# Patient Record
Sex: FEMALE
[De-identification: names, ages, dates, MRNs, and addresses within clinical notes are randomized per-mention and may not be internally consistent; named-entity substitution may affect disease eponyms.]

---

## 2018-01-16 ENCOUNTER — HOSPITAL (OUTPATIENT)
Dept: OTHER | Age: 33
End: 2018-01-16
Attending: EMERGENCY MEDICINE

## 2018-01-16 LAB
AMORPH SED URNS QL MICRO: ABNORMAL
AMPHETAMINES UR QL SCN>500 NG/ML: POSITIVE
ANALYZER ANC (IANC): ABNORMAL
ANION GAP SERPL CALC-SCNC: 19 MMOL/L (ref 10–20)
APAP SERPL-MCNC: <2 MCG/ML (ref 10–30)
APPEARANCE UR: CLEAR
BACTERIA #/AREA URNS HPF: ABNORMAL /HPF
BARBITURATES UR QL SCN>200 NG/ML: NEGATIVE
BASOPHILS # BLD: 0.1 THOUSAND/MCL (ref 0–0.3)
BASOPHILS NFR BLD: 1 %
BENZODIAZ UR QL SCN>200 NG/ML: NEGATIVE
BILIRUB UR QL: NEGATIVE
BUN SERPL-MCNC: 7 MG/DL (ref 6–20)
BUN/CREAT SERPL: 9 (ref 7–25)
BZE UR QL SCN>150 NG/ML: NEGATIVE
CALCIUM SERPL-MCNC: 9.8 MG/DL (ref 8.4–10.2)
CANNABINOIDS UR QL SCN>50 NG/ML: NEGATIVE
CAOX CRY URNS QL MICRO: ABNORMAL
CHLORIDE: 101 MMOL/L (ref 98–107)
CO2 SERPL-SCNC: 25 MMOL/L (ref 21–32)
COLOR UR: YELLOW
CREAT SERPL-MCNC: 0.75 MG/DL (ref 0.51–0.95)
DIFFERENTIAL METHOD BLD: ABNORMAL
EOSINOPHIL # BLD: 0.1 THOUSAND/MCL (ref 0.1–0.5)
EOSINOPHIL NFR BLD: 2 %
EPITH CASTS #/AREA URNS LPF: ABNORMAL /[LPF]
ERYTHROCYTE [DISTWIDTH] IN BLOOD: 12.3 % (ref 11–15)
ETHANOL SERPL-MCNC: NORMAL MG/DL
FATTY CASTS #/AREA URNS LPF: ABNORMAL /[LPF]
GLUCOSE SERPL-MCNC: 115 MG/DL (ref 65–99)
GLUCOSE UR-MCNC: NEGATIVE MG/DL
GRAN CASTS #/AREA URNS LPF: ABNORMAL /[LPF]
HEMATOCRIT: 46 % (ref 36–46.5)
HGB BLD-MCNC: 15.9 GM/DL (ref 12–15.5)
HGB UR QL: NEGATIVE
HYALINE CASTS #/AREA URNS LPF: ABNORMAL /LPF (ref 0–5)
KETONES UR-MCNC: NEGATIVE MG/DL
LEUKOCYTE ESTERASE UR QL STRIP: ABNORMAL
LYMPHOCYTES # BLD: 2.4 THOUSAND/MCL (ref 1–4.8)
LYMPHOCYTES NFR BLD: 33 %
MCH RBC QN AUTO: 31.5 PG (ref 26–34)
MCHC RBC AUTO-ENTMCNC: 34.6 GM/DL (ref 32–36.5)
MCV RBC AUTO: 91.1 FL (ref 78–100)
MIXED CELL CASTS #/AREA URNS LPF: ABNORMAL /[LPF]
MONOCYTES # BLD: 1 THOUSAND/MCL (ref 0.3–0.9)
MONOCYTES NFR BLD: 14 %
MUCOUS THREADS URNS QL MICRO: PRESENT
NEUTROPHILS # BLD: 3.6 THOUSAND/MCL (ref 1.8–7.7)
NEUTROPHILS NFR BLD: 50 %
NEUTS SEG NFR BLD: ABNORMAL %
NITRITE UR QL: NEGATIVE
OPIATES UR QL SCN>300 NG/ML: NEGATIVE
PCP UR QL SCN>25 NG/ML: NEGATIVE
PERCENT NRBC: ABNORMAL
PH UR: 6 UNIT (ref 5–7)
PLATELET # BLD: 516 THOUSAND/MCL (ref 140–450)
POTASSIUM SERPL-SCNC: 5 MMOL/L (ref 3.4–5.1)
PROT UR QL: NEGATIVE MG/DL
RBC # BLD: 5.05 MILLION/MCL (ref 4–5.2)
RBC #/AREA URNS HPF: ABNORMAL /HPF (ref 0–3)
RBC CASTS #/AREA URNS LPF: ABNORMAL /[LPF]
RENAL EPI CELLS #/AREA URNS HPF: ABNORMAL /[HPF]
SALICYLATES SERPL-MCNC: <2.8 MG/DL
SODIUM SERPL-SCNC: 140 MMOL/L (ref 135–145)
SP GR UR: 1.01 (ref 1–1.03)
SPECIMEN SOURCE: ABNORMAL
SPERM URNS QL MICRO: ABNORMAL
SQUAMOUS #/AREA URNS HPF: ABNORMAL /HPF (ref 0–5)
T VAGINALIS URNS QL MICRO: ABNORMAL
TRI-PHOS CRY URNS QL MICRO: ABNORMAL
URATE CRY URNS QL MICRO: ABNORMAL
URINE REFLEX: ABNORMAL
URNS CMNT MICRO: ABNORMAL
UROBILINOGEN UR QL: 0.2 MG/DL (ref 0–1)
WAXY CASTS #/AREA URNS LPF: ABNORMAL /[LPF]
WBC # BLD: 7.2 THOUSAND/MCL (ref 4.2–11)
WBC #/AREA URNS HPF: ABNORMAL /HPF (ref 0–5)
WBC CASTS #/AREA URNS LPF: ABNORMAL /[LPF]
YEAST HYPHAE URNS QL MICRO: ABNORMAL
YEAST URNS QL MICRO: ABNORMAL

## 2018-01-17 ENCOUNTER — DIAGNOSTIC TRANS (OUTPATIENT)
Dept: OTHER | Age: 33
End: 2018-01-17

## 2018-01-17 LAB — HCG POINT OF CARE (5HGRST): NEGATIVE

## 2018-02-05 ENCOUNTER — IMAGING SERVICES (OUTPATIENT)
Dept: OTHER | Age: 33
End: 2018-02-05

## 2018-02-05 ENCOUNTER — HOSPITAL (OUTPATIENT)
Dept: OTHER | Age: 33
End: 2018-02-05

## 2018-02-05 LAB
ANALYZER ANC (IANC): NORMAL
ANION GAP SERPL CALC-SCNC: 17 MMOL/L (ref 10–20)
BASOPHILS # BLD: 0.1 THOUSAND/MCL (ref 0–0.3)
BASOPHILS NFR BLD: 1 %
BUN SERPL-MCNC: 18 MG/DL (ref 6–20)
BUN/CREAT SERPL: 22 (ref 7–25)
CALCIUM SERPL-MCNC: 9.5 MG/DL (ref 8.4–10.2)
CHLORIDE: 102 MMOL/L (ref 98–107)
CO2 SERPL-SCNC: 24 MMOL/L (ref 21–32)
CREAT SERPL-MCNC: 0.81 MG/DL (ref 0.51–0.95)
DIFFERENTIAL METHOD BLD: NORMAL
EOSINOPHIL # BLD: 0.2 THOUSAND/MCL (ref 0.1–0.5)
EOSINOPHIL NFR BLD: 2 %
ERYTHROCYTE [DISTWIDTH] IN BLOOD: 12.5 % (ref 11–15)
ETHANOL SERPL-MCNC: NORMAL MG/DL
GLUCOSE SERPL-MCNC: 121 MG/DL (ref 65–99)
HEMATOCRIT: 40.4 % (ref 36–46.5)
HGB BLD-MCNC: 13.6 GM/DL (ref 12–15.5)
LYMPHOCYTES # BLD: 2.2 THOUSAND/MCL (ref 1–4.8)
LYMPHOCYTES NFR BLD: 29 %
MCH RBC QN AUTO: 30.3 PG (ref 26–34)
MCHC RBC AUTO-ENTMCNC: 33.7 GM/DL (ref 32–36.5)
MCV RBC AUTO: 90 FL (ref 78–100)
MONOCYTES # BLD: 0.9 THOUSAND/MCL (ref 0.3–0.9)
MONOCYTES NFR BLD: 12 %
NEUTROPHILS # BLD: 4.2 THOUSAND/MCL (ref 1.8–7.7)
NEUTROPHILS NFR BLD: 56 %
NEUTS SEG NFR BLD: NORMAL %
PERCENT NRBC: NORMAL
PLATELET # BLD: 378 THOUSAND/MCL (ref 140–450)
POTASSIUM SERPL-SCNC: 4 MMOL/L (ref 3.4–5.1)
RBC # BLD: 4.49 MILLION/MCL (ref 4–5.2)
SODIUM SERPL-SCNC: 139 MMOL/L (ref 135–145)
WBC # BLD: 7.6 THOUSAND/MCL (ref 4.2–11)

## 2018-02-06 LAB
AMPHETAMINES UR QL SCN>500 NG/ML: POSITIVE
BARBITURATES UR QL SCN>200 NG/ML: NEGATIVE
BENZODIAZ UR QL SCN>200 NG/ML: POSITIVE
BZE UR QL SCN>150 NG/ML: NEGATIVE
CANNABINOIDS UR QL SCN>50 NG/ML: NEGATIVE
HCG POINT OF CARE (5HGRST): NEGATIVE
METHADONE UR QL SCN>300 NG/ML: NEGATIVE NG/ML
OPIATES UR QL SCN>300 NG/ML: NEGATIVE
PCP UR QL SCN>25 NG/ML: NEGATIVE

## 2020-07-22 ENCOUNTER — OFFICE VISIT (OUTPATIENT)
Dept: GASTROENTEROLOGY | Facility: CLINIC | Age: 35
End: 2020-07-22

## 2020-07-22 VITALS
SYSTOLIC BLOOD PRESSURE: 112 MMHG | HEART RATE: 86 BPM | TEMPERATURE: 98.6 F | HEIGHT: 63 IN | WEIGHT: 155 LBS | BODY MASS INDEX: 27.46 KG/M2 | OXYGEN SATURATION: 99 % | DIASTOLIC BLOOD PRESSURE: 64 MMHG

## 2020-07-22 DIAGNOSIS — R74.8 ELEVATED LIVER ENZYMES: ICD-10-CM

## 2020-07-22 DIAGNOSIS — B18.2 CHRONIC HEPATITIS C WITHOUT HEPATIC COMA (HCC): Primary | ICD-10-CM

## 2020-07-22 PROCEDURE — 99204 OFFICE O/P NEW MOD 45 MIN: CPT | Performed by: NURSE PRACTITIONER

## 2020-07-22 RX ORDER — TETRACYCLINE HCL 500 MG
1 CAPSULE ORAL DAILY
COMMUNITY
End: 2022-08-03 | Stop reason: HOSPADM

## 2020-07-22 RX ORDER — FOLIC ACID 1 MG/1
1 TABLET ORAL DAILY
COMMUNITY
End: 2022-08-03 | Stop reason: HOSPADM

## 2020-07-22 NOTE — PATIENT INSTRUCTIONS
Hepatitis C  Hepatitis C is a liver infection that is caused by a virus. Many people have no symptoms or only mild symptoms. Hepatitis C is contagious. This means that it can spread from person to person. You can get this disease through:  · Blood.  · Birth.  · Body fluids, like breast milk, tears, semen, vaginal fluids, and spit (saliva).  · Blood or organ donations done in the [place] before 1992.  Follow these instructions at home:  Medicines  · Take over-the-counter and prescription medicines only as told by your doctor.  · Take your antiviral medicine as told by your doctor. Do not stop taking the antiviral medicine even if you start to feel better.  · Do not take any new medicines unless your doctor says that this is okay. This includes over-the-counter medicines and birth control pills.  Activity  · Rest when you feel tired.  · Do not have sex until your doctor says this is okay.  · Ask your doctor when you may go back to school or work.  Eating and drinking    · Eat a balanced diet. Eat plenty of:  ? Fruits and vegetables.  ? Whole grains.  ? Low-fat (lean) meats or non-meat proteins, such as beans or tofu.  · Drink enough fluids to keep your pee (urine) clear or pale yellow.  · Do not drink alcohol.  How is this prevented?  · Wash your hands often with soap and water. If you do not have soap and water, use hand .  · Do not share needles or syringes.  · Practice safe sex and use condoms.  · Do not handle blood or body fluids without gloves or other protection.  · Avoid getting tattoos or piercings in places that are not clean.  General instructions  · Do not share toothbrushes, nail clippers, or razors.  · Wash your hands often with soap and water. If you do not have soap and water, use hand .  · Cover any cuts or open sores on your skin.  · Keep all follow-up visits as told by your doctor. This is important. You may need follow-up visits every 6-12 months.  Contact a doctor  if:  · You have a fever.  · You have belly (abdominal) pain.  · Your pee (urine) is dark.  · Your poop (bowel movement) is the color of hans.  · You have joint pain.  Get help right away if:  · You feel more and more tired (fatigued).  · You feel more and more weak.  · You do not feel like eating.  · You cannot eat or drink without throwing up (vomiting).  · Your skin or the whites of your eyes turn yellow (jaundice) or turn more yellow than they were before.  · You bruise or bleed easily.  Summary  · Hepatitis C is a liver infection that is caused by a virus.  · The virus can be spread through blood and body fluids.  · Do not take any new medicines unless your doctor says that this is okay.  · Wash your hands often with soap and water. This helps prevent infection.  This information is not intended to replace advice given to you by your health care provider. Make sure you discuss any questions you have with your health care provider.  Document Released: 11/30/2009 Document Revised: 11/30/2018 Document Reviewed: 01/23/2018  [employer] Patient Education © 2020 [employer]    Hepatitis C  Hepatitis C is a liver infection that is caused by a virus. Many people have no symptoms or only mild symptoms. Hepatitis C is contagious. This means that it can spread from person to person. You can get this disease through:  · Blood.  · Birth.  · Body fluids, like breast milk, tears, semen, vaginal fluids, and spit (saliva).  · Blood or organ donations done in the [place] before 1992.  Follow these instructions at home:  Medicines  · Take over-the-counter and prescription medicines only as told by your doctor.  · Take your antiviral medicine as told by your doctor. Do not stop taking the antiviral medicine even if you start to feel better.  · Do not take any new medicines unless your doctor says that this is okay. This includes over-the-counter medicines and birth control pills.  Activity  · Rest when you feel tired.  · Do  not have sex until your doctor says this is okay.  · Ask your doctor when you may go back to school or work.  Eating and drinking    · Eat a balanced diet. Eat plenty of:  ? Fruits and vegetables.  ? Whole grains.  ? Low-fat (lean) meats or non-meat proteins, such as beans or tofu.  · Drink enough fluids to keep your pee (urine) clear or pale yellow.  · Do not drink alcohol.  How is this prevented?  · Wash your hands often with soap and water. If you do not have soap and water, use hand .  · Do not share needles or syringes.  · Practice safe sex and use condoms.  · Do not handle blood or body fluids without gloves or other protection.  · Avoid getting tattoos or piercings in places that are not clean.  General instructions  · Do not share toothbrushes, nail clippers, or razors.  · Wash your hands often with soap and water. If you do not have soap and water, use hand .  · Cover any cuts or open sores on your skin.  · Keep all follow-up visits as told by your doctor. This is important. You may need follow-up visits every 6-12 months.  Contact a doctor if:  · You have a fever.  · You have belly (abdominal) pain.  · Your pee (urine) is dark.  · Your poop (bowel movement) is the color of hans.  · You have joint pain.  Get help right away if:  · You feel more and more tired (fatigued).  · You feel more and more weak.  · You do not feel like eating.  · You cannot eat or drink without throwing up (vomiting).  · Your skin or the whites of your eyes turn yellow (jaundice) or turn more yellow than they were before.  · You bruise or bleed easily.  Summary  · Hepatitis C is a liver infection that is caused by a virus.  · The virus can be spread through blood and body fluids.  · Do not take any new medicines unless your doctor says that this is okay.  · Wash your hands often with soap and water. This helps prevent infection.  This information is not intended to replace advice given to you by your health care  provider. Make sure you discuss any questions you have with your health care provider.  Document Released: 11/30/2009 Document Revised: 11/30/2018 Document Reviewed: 01/23/2018  Elsevier Patient Education © 2020 [employer].

## 2020-07-22 NOTE — PROGRESS NOTES
"Chief Complaint:   Chief Complaint   Patient presents with   • Hepatitis C     Pt recently tested positive for hep C-states at one point in the past she tested positive but after having futher labs they don't her it was negative; Pt presents today to discuss treatment options          Patient ID: Latisha Turner is a 35 y.o. female     History of Present Illness: This is a very pleasant 35-year-old female who is here today with diagnosis of hepatitis C.    Patient states that sometime in the past she had tested positive for hepatitis C about 10 years ago but she states after that \"no other labs were done and I thought it was negative because I was in long-term.\"  The patient states labs were repeated recently as noted below.  Patient is naïve to treatment. The patient has a history of tattoos, history of IV drug use, no blood transfusions.     6/16/2020 CMP unremarkable with the exception of ALT 37 AST within normal limits 27.  CBC unremarkable.  Platelet count 452.  Hepatitis B surface antibody reactive.  Hepatitis C antibody reactive HCVRNA quantitative 99518    The patient denies any nausea, vomiting, epigastric pain, dysphagia, pyrosis or hematemesis.  The patient denies any fever or chills.  Denies any melena or hematochezia.  Denies any unintentional weight loss or loss of appetite.  The patient denies any jaundice, icterus, edema or ascites.    Past Medical History:   Diagnosis Date   • Chronic hepatitis C (CMS/HCC)    • Lupus (CMS/HCC)        History reviewed. No pertinent surgical history.      Current Outpatient Medications:   •  Apple Cider Vinegar 500 MG tablet, Take 1 tablet by mouth Daily., Disp: , Rfl:   •  B Complex Vitamins (VITAMIN B COMPLEX PO), Take 1 capsule by mouth Daily., Disp: , Rfl:   •  folic acid (FOLVITE) 1 MG tablet, Take 1 mg by mouth Daily., Disp: , Rfl:   •  methotrexate 2.5 MG tablet, Take 10 mg by mouth 1 (One) Time Per Week., Disp: , Rfl:   •  Multiple Vitamins-Minerals (MULTIVITAMIN " "ADULT PO), Take 1 capsule by mouth Daily., Disp: , Rfl:     No Known Allergies    Social History     Socioeconomic History   • Marital status: Single     Spouse name: Not on file   • Number of children: Not on file   • Years of education: Not on file   • Highest education level: Not on file   Tobacco Use   • Smoking status: Current Every Day Smoker     Packs/day: 2.00     Types: Cigarettes   • Smokeless tobacco: Never Used   Substance and Sexual Activity   • Alcohol use: Not Currently   • Drug use: Not Currently       Family History   Problem Relation Age of Onset   • Colon cancer Neg Hx    • Colon polyps Neg Hx    • Esophageal cancer Neg Hx    • Liver cancer Neg Hx    • Liver disease Neg Hx    • Rectal cancer Neg Hx    • Stomach cancer Neg Hx        Vitals:    07/22/20 1336   BP: 112/64   BP Location: Left arm   Patient Position: Sitting   Cuff Size: Adult   Pulse: 86   Temp: 98.6 °F (37 °C)   TempSrc: Tympanic   SpO2: 99%   Weight: 70.3 kg (155 lb)   Height: 160 cm (63\")       Review of Systems:    General:    Present -feeling well   Skin:    Not Present-Rash   HEENT:     Not Present-Acute visual changes or Acute hearing changes   Neck :    Not Present- swollen glands   Genitourinary:      Not Present- burning, frequency, urgency hematuria, dysuria,   Cardiovascular:   Not Present-chest pain, palpitations, or pressure   Respiratory:   Not Present- shortness of breath or cough   Gastrointestinal:  Musculoskeletal:  Neurological:  Psychiatric:   Present as mentioned in the HP    Not Present. Recent gait disturbances.    Not Present-Seizures and weakness in extremities.    Not Present- Anxiety or Depression.       Physical Exam:    General Appearance:    Alert, cooperative, in no acute distress   Psych:    Mood appropriate    Eyes:          conjunctivae and sclerae normal, no   icterus, no pallor   ENMT:    Ears appear intact with no abnormalities noted oral mucosa moist   Neck:   No adenopathy, supple, trachea " midline, no thyromegaly, no   carotid bruit, no JVD    Cardiovascular:    Regular rhythm and normal rate, normal S1 and S2, no            murmur, no gallop, no rub, no click   Gastrointestinal:     Inspection normal.  Normal bowel sounds, no masses, no organomegaly, soft round non-tender, non-distended, no guarding, no rebound or tenderness. No hepatosplenomegaly.   Skin:   No bleeding, bruising or rash   Neurologic:   nonfocal         Body mass index is 27.46 kg/m². Patient's Body mass index is 27.46 kg/m². BMI is above normal parameters. Recommendations include: nutrition counseling.    Assessment and Plan:  Assessment/Plan   Latisha was seen today for hepatitis c.    Diagnoses and all orders for this visit:    Chronic hepatitis C without hepatic coma (CMS/HCC)  -     HCV RNA By PCR, Qn Rfx Chani; Future  -     HCV FibroSURE; Future  -     Hepatitis A Antibody, Total; Future  -     Hepatitis A Antibody, IgM; Future  -     Drug Screen (10), Serum; Future  -     Ethanol; Future  -     HIV-1 & HIV-2 Antibodies; Future  -     US Elastography Parenchyma; Future  -     US Liver; Future    Elevated liver enzymes  -     HCV RNA By PCR, Qn Rfx Chani; Future  -     HCV FibroSURE; Future  -     Hepatitis A Antibody, Total; Future  -     Hepatitis A Antibody, IgM; Future  -     Drug Screen (10), Serum; Future  -     Ethanol; Future  -     HIV-1 & HIV-2 Antibodies; Future  -     US Elastography Parenchyma; Future  -     US Liver; Future      Labs and imaging orders placed.  When all are resulted will contact patient and submit for prior authorization for treatment of hepatitis C.     Patient Instructions   Hepatitis C  Hepatitis C is a liver infection that is caused by a virus. Many people have no symptoms or only mild symptoms. Hepatitis C is contagious. This means that it can spread from person to person. You can get this disease through:  · Blood.  · Birth.  · Body fluids, like breast milk, tears, semen, vaginal fluids, and spit  (saliva).  · Blood or organ donations done in the [place] before 1992.  Follow these instructions at home:  Medicines  · Take over-the-counter and prescription medicines only as told by your doctor.  · Take your antiviral medicine as told by your doctor. Do not stop taking the antiviral medicine even if you start to feel better.  · Do not take any new medicines unless your doctor says that this is okay. This includes over-the-counter medicines and birth control pills.  Activity  · Rest when you feel tired.  · Do not have sex until your doctor says this is okay.  · Ask your doctor when you may go back to school or work.  Eating and drinking    · Eat a balanced diet. Eat plenty of:  ? Fruits and vegetables.  ? Whole grains.  ? Low-fat (lean) meats or non-meat proteins, such as beans or tofu.  · Drink enough fluids to keep your pee (urine) clear or pale yellow.  · Do not drink alcohol.  How is this prevented?  · Wash your hands often with soap and water. If you do not have soap and water, use hand .  · Do not share needles or syringes.  · Practice safe sex and use condoms.  · Do not handle blood or body fluids without gloves or other protection.  · Avoid getting tattoos or piercings in places that are not clean.  General instructions  · Do not share toothbrushes, nail clippers, or razors.  · Wash your hands often with soap and water. If you do not have soap and water, use hand .  · Cover any cuts or open sores on your skin.  · Keep all follow-up visits as told by your doctor. This is important. You may need follow-up visits every 6-12 months.  Contact a doctor if:  · You have a fever.  · You have belly (abdominal) pain.  · Your pee (urine) is dark.  · Your poop (bowel movement) is the color of hans.  · You have joint pain.  Get help right away if:  · You feel more and more tired (fatigued).  · You feel more and more weak.  · You do not feel like eating.  · You cannot eat or drink without  throwing up (vomiting).  · Your skin or the whites of your eyes turn yellow (jaundice) or turn more yellow than they were before.  · You bruise or bleed easily.  Summary  · Hepatitis C is a liver infection that is caused by a virus.  · The virus can be spread through blood and body fluids.  · Do not take any new medicines unless your doctor says that this is okay.  · Wash your hands often with soap and water. This helps prevent infection.  This information is not intended to replace advice given to you by your health care provider. Make sure you discuss any questions you have with your health care provider.  Document Released: 11/30/2009 Document Revised: 11/30/2018 Document Reviewed: 01/23/2018  [employer] Patient Education © 2020 [employer]    Hepatitis C  Hepatitis C is a liver infection that is caused by a virus. Many people have no symptoms or only mild symptoms. Hepatitis C is contagious. This means that it can spread from person to person. You can get this disease through:  · Blood.  · Birth.  · Body fluids, like breast milk, tears, semen, vaginal fluids, and spit (saliva).  · Blood or organ donations done in the [place] before 1992.  Follow these instructions at home:  Medicines  · Take over-the-counter and prescription medicines only as told by your doctor.  · Take your antiviral medicine as told by your doctor. Do not stop taking the antiviral medicine even if you start to feel better.  · Do not take any new medicines unless your doctor says that this is okay. This includes over-the-counter medicines and birth control pills.  Activity  · Rest when you feel tired.  · Do not have sex until your doctor says this is okay.  · Ask your doctor when you may go back to school or work.  Eating and drinking    · Eat a balanced diet. Eat plenty of:  ? Fruits and vegetables.  ? Whole grains.  ? Low-fat (lean) meats or non-meat proteins, such as beans or tofu.  · Drink enough fluids to keep your pee (urine) clear  or pale yellow.  · Do not drink alcohol.  How is this prevented?  · Wash your hands often with soap and water. If you do not have soap and water, use hand .  · Do not share needles or syringes.  · Practice safe sex and use condoms.  · Do not handle blood or body fluids without gloves or other protection.  · Avoid getting tattoos or piercings in places that are not clean.  General instructions  · Do not share toothbrushes, nail clippers, or razors.  · Wash your hands often with soap and water. If you do not have soap and water, use hand .  · Cover any cuts or open sores on your skin.  · Keep all follow-up visits as told by your doctor. This is important. You may need follow-up visits every 6-12 months.  Contact a doctor if:  · You have a fever.  · You have belly (abdominal) pain.  · Your pee (urine) is dark.  · Your poop (bowel movement) is the color of hans.  · You have joint pain.  Get help right away if:  · You feel more and more tired (fatigued).  · You feel more and more weak.  · You do not feel like eating.  · You cannot eat or drink without throwing up (vomiting).  · Your skin or the whites of your eyes turn yellow (jaundice) or turn more yellow than they were before.  · You bruise or bleed easily.  Summary  · Hepatitis C is a liver infection that is caused by a virus.  · The virus can be spread through blood and body fluids.  · Do not take any new medicines unless your doctor says that this is okay.  · Wash your hands often with soap and water. This helps prevent infection.  This information is not intended to replace advice given to you by your health care provider. Make sure you discuss any questions you have with your health care provider.  Document Released: 11/30/2009 Document Revised: 11/30/2018 Document Reviewed: 01/23/2018  [employer] Patient Education © 2020 [employer].        Next follow-up appointment        EMR Dragon/Transcription disclaimer:  Much of this encounter note is an  electronic transcription/translation of spoken language to printed text. The electronic translation of spoken language may permit erroneous, or at times, nonsensical words or phrases to be inadvertently transcribed; although I have reviewed the note for such errors, some may still exist.

## 2020-07-23 ENCOUNTER — TELEPHONE (OUTPATIENT)
Dept: GASTROENTEROLOGY | Facility: CLINIC | Age: 35
End: 2020-07-23

## 2020-07-23 DIAGNOSIS — R74.8 ELEVATED LIVER ENZYMES: ICD-10-CM

## 2020-07-23 DIAGNOSIS — B18.2 CHRONIC HEPATITIS C WITHOUT HEPATIC COMA (HCC): Primary | ICD-10-CM

## 2020-07-23 NOTE — TELEPHONE ENCOUNTER
Had to change one of the orders for the ultrasounds.     I have entered that and pended it to Alethea for review and signature.    I'll cx the incorrect order for her.

## 2020-07-30 ENCOUNTER — APPOINTMENT (OUTPATIENT)
Dept: ULTRASOUND IMAGING | Facility: HOSPITAL | Age: 35
End: 2020-07-30

## 2020-09-04 ENCOUNTER — HOSPITAL ENCOUNTER (OUTPATIENT)
Dept: ULTRASOUND IMAGING | Facility: HOSPITAL | Age: 35
Discharge: HOME | End: 2020-09-04
Admitting: NURSE PRACTITIONER

## 2020-09-04 ENCOUNTER — HOSPITAL ENCOUNTER (OUTPATIENT)
Dept: ULTRASOUND IMAGING | Facility: HOSPITAL | Age: 35
Discharge: HOME | End: 2020-09-04

## 2020-09-04 DIAGNOSIS — R74.8 ELEVATED LIVER ENZYMES: ICD-10-CM

## 2020-09-04 DIAGNOSIS — B18.2 CHRONIC HEPATITIS C WITHOUT HEPATIC COMA (HCC): ICD-10-CM

## 2020-09-04 PROCEDURE — 76981 USE PARENCHYMA: CPT

## 2020-09-04 PROCEDURE — 76705 ECHO EXAM OF ABDOMEN: CPT

## 2020-09-09 ENCOUNTER — TELEPHONE (OUTPATIENT)
Dept: GASTROENTEROLOGY | Facility: CLINIC | Age: 35
End: 2020-09-09

## 2020-09-10 ENCOUNTER — TELEPHONE (OUTPATIENT)
Dept: GASTROENTEROLOGY | Facility: CLINIC | Age: 35
End: 2020-09-10

## 2020-09-10 NOTE — TELEPHONE ENCOUNTER
Called and spoke to pt re: results-she VU. Pt tells me that she had a family emergency and that is why she hasn't had labs done. She was advised that she could do those at any point and when those were back we would be ready to submit paperwork for hep C treatment. Pt also advised to call me back with any further questions/problems.

## 2022-07-28 ENCOUNTER — TELEPHONE (OUTPATIENT)
Dept: OBSTETRICS AND GYNECOLOGY | Facility: CLINIC | Age: 37
End: 2022-07-28

## 2022-07-28 NOTE — TELEPHONE ENCOUNTER
Referral was placed in our office to schedule a new ob appointment, I tried to call patient to schedule that and the patient did not answer and I left a message.

## 2022-08-03 ENCOUNTER — LAB (OUTPATIENT)
Dept: LAB | Facility: HOSPITAL | Age: 37
End: 2022-08-03

## 2022-08-03 ENCOUNTER — INITIAL PRENATAL (OUTPATIENT)
Dept: OBSTETRICS AND GYNECOLOGY | Facility: CLINIC | Age: 37
End: 2022-08-03

## 2022-08-03 VITALS — SYSTOLIC BLOOD PRESSURE: 98 MMHG | BODY MASS INDEX: 27.81 KG/M2 | DIASTOLIC BLOOD PRESSURE: 62 MMHG | WEIGHT: 157 LBS

## 2022-08-03 DIAGNOSIS — O09.529 ANTEPARTUM MULTIGRAVIDA OF ADVANCED MATERNAL AGE: Primary | ICD-10-CM

## 2022-08-03 DIAGNOSIS — O36.80X0 ENCOUNTER TO DETERMINE FETAL VIABILITY OF PREGNANCY, SINGLE OR UNSPECIFIED FETUS: ICD-10-CM

## 2022-08-03 DIAGNOSIS — O98.419 CHRONIC HEPATITIS C COMPLICATING PREGNANCY, ANTEPARTUM: ICD-10-CM

## 2022-08-03 DIAGNOSIS — F17.210 CIGARETTE SMOKER: ICD-10-CM

## 2022-08-03 DIAGNOSIS — F19.91 HISTORY OF ILLICIT DRUG USE: ICD-10-CM

## 2022-08-03 DIAGNOSIS — Z32.01 POSITIVE PREGNANCY TEST: ICD-10-CM

## 2022-08-03 DIAGNOSIS — B18.2 CHRONIC HEPATITIS C COMPLICATING PREGNANCY, ANTEPARTUM: ICD-10-CM

## 2022-08-03 DIAGNOSIS — Z78.9 DATE OF LAST MENSTRUAL PERIOD (LMP) UNKNOWN: ICD-10-CM

## 2022-08-03 DIAGNOSIS — Z32.00 PREGNANCY EXAMINATION OR TEST, PREGNANCY UNCONFIRMED: ICD-10-CM

## 2022-08-03 LAB
ABO GROUP BLD: NORMAL
AMPHET+METHAMPHET UR QL: NEGATIVE
AMPHETAMINES UR QL: NEGATIVE
B-HCG UR QL: POSITIVE
BARBITURATES UR QL SCN: NEGATIVE
BASOPHILS # BLD AUTO: 0.06 10*3/MM3 (ref 0–0.2)
BASOPHILS NFR BLD AUTO: 0.7 % (ref 0–1.5)
BENZODIAZ UR QL SCN: NEGATIVE
BILIRUB UR QL STRIP: NEGATIVE
BLD GP AB SCN SERPL QL: NEGATIVE
BUPRENORPHINE SERPL-MCNC: NEGATIVE NG/ML
CANNABINOIDS SERPL QL: NEGATIVE
CLARITY UR: CLEAR
COCAINE UR QL: NEGATIVE
COLOR UR: YELLOW
DEPRECATED RDW RBC AUTO: 40.3 FL (ref 37–54)
EOSINOPHIL # BLD AUTO: 0.06 10*3/MM3 (ref 0–0.4)
EOSINOPHIL NFR BLD AUTO: 0.7 % (ref 0.3–6.2)
ERYTHROCYTE [DISTWIDTH] IN BLOOD BY AUTOMATED COUNT: 12.2 % (ref 12.3–15.4)
EXPIRATION DATE: ABNORMAL
GLUCOSE UR STRIP-MCNC: NEGATIVE MG/DL
HBV SURFACE AG SERPL QL IA: NORMAL
HCG INTACT+B SERPL-ACNC: NORMAL MIU/ML
HCT VFR BLD AUTO: 39 % (ref 34–46.6)
HCV AB SER DONR QL: REACTIVE
HGB BLD-MCNC: 13.3 G/DL (ref 12–15.9)
HGB UR QL STRIP.AUTO: NEGATIVE
HIV1+2 AB SER QL: NORMAL
IMM GRANULOCYTES # BLD AUTO: 0.05 10*3/MM3 (ref 0–0.05)
IMM GRANULOCYTES NFR BLD AUTO: 0.6 % (ref 0–0.5)
INTERNAL NEGATIVE CONTROL: NEGATIVE
INTERNAL POSITIVE CONTROL: POSITIVE
KETONES UR QL STRIP: NEGATIVE
LEUKOCYTE ESTERASE UR QL STRIP.AUTO: ABNORMAL
LYMPHOCYTES # BLD AUTO: 1.7 10*3/MM3 (ref 0.7–3.1)
LYMPHOCYTES NFR BLD AUTO: 18.7 % (ref 19.6–45.3)
Lab: ABNORMAL
Lab: NORMAL
MCH RBC QN AUTO: 31.2 PG (ref 26.6–33)
MCHC RBC AUTO-ENTMCNC: 34.1 G/DL (ref 31.5–35.7)
MCV RBC AUTO: 91.5 FL (ref 79–97)
METHADONE UR QL SCN: NEGATIVE
MONOCYTES # BLD AUTO: 0.79 10*3/MM3 (ref 0.1–0.9)
MONOCYTES NFR BLD AUTO: 8.7 % (ref 5–12)
NEUTROPHILS NFR BLD AUTO: 6.41 10*3/MM3 (ref 1.7–7)
NEUTROPHILS NFR BLD AUTO: 70.6 % (ref 42.7–76)
NITRITE UR QL STRIP: NEGATIVE
NRBC BLD AUTO-RTO: 0 /100 WBC (ref 0–0.2)
OPIATES UR QL: NEGATIVE
OXYCODONE UR QL SCN: NEGATIVE
PCP UR QL SCN: NEGATIVE
PH UR STRIP.AUTO: 8 [PH] (ref 5–8)
PLATELET # BLD AUTO: 396 10*3/MM3 (ref 140–450)
PMV BLD AUTO: 9.3 FL (ref 6–12)
PROPOXYPH UR QL: NEGATIVE
PROT UR QL STRIP: NEGATIVE
RBC # BLD AUTO: 4.26 10*6/MM3 (ref 3.77–5.28)
RH BLD: POSITIVE
SP GR UR STRIP: 1.01 (ref 1–1.03)
TRICYCLICS UR QL SCN: NEGATIVE
UROBILINOGEN UR QL STRIP: ABNORMAL
WBC NRBC COR # BLD: 9.07 10*3/MM3 (ref 3.4–10.8)

## 2022-08-03 PROCEDURE — 80081 OBSTETRIC PANEL INC HIV TSTG: CPT | Performed by: NURSE PRACTITIONER

## 2022-08-03 PROCEDURE — 87522 HEPATITIS C REVRS TRNSCRPJ: CPT | Performed by: NURSE PRACTITIONER

## 2022-08-03 PROCEDURE — 80306 DRUG TEST PRSMV INSTRMNT: CPT | Performed by: NURSE PRACTITIONER

## 2022-08-03 PROCEDURE — 86803 HEPATITIS C AB TEST: CPT | Performed by: NURSE PRACTITIONER

## 2022-08-03 PROCEDURE — 84702 CHORIONIC GONADOTROPIN TEST: CPT | Performed by: NURSE PRACTITIONER

## 2022-08-03 PROCEDURE — 36415 COLL VENOUS BLD VENIPUNCTURE: CPT

## 2022-08-03 PROCEDURE — 81025 URINE PREGNANCY TEST: CPT | Performed by: NURSE PRACTITIONER

## 2022-08-03 PROCEDURE — 87591 N.GONORRHOEAE DNA AMP PROB: CPT | Performed by: NURSE PRACTITIONER

## 2022-08-03 PROCEDURE — 81003 URINALYSIS AUTO W/O SCOPE: CPT | Performed by: NURSE PRACTITIONER

## 2022-08-03 PROCEDURE — 87491 CHLMYD TRACH DNA AMP PROBE: CPT | Performed by: NURSE PRACTITIONER

## 2022-08-03 PROCEDURE — 87661 TRICHOMONAS VAGINALIS AMPLIF: CPT | Performed by: NURSE PRACTITIONER

## 2022-08-03 PROCEDURE — 87086 URINE CULTURE/COLONY COUNT: CPT | Performed by: NURSE PRACTITIONER

## 2022-08-03 RX ORDER — PRENATAL VIT NO.126/IRON/FOLIC 28MG-0.8MG
TABLET ORAL DAILY
COMMUNITY

## 2022-08-03 NOTE — PROGRESS NOTES
I spent approximately 60 minutes with the patient acquiring the health and history intake, reviewing prior records, discussing topics related to healthy pregnancy, entering orders, and documentation. Her LMP is unknown. She thinks it may have been May 5th or May 15th.  This was not a planned pregnancy. This is her 3rd pregnancy. She had a vaginal delivery with her son in August 2009. She says she delivered at 38 weeks gestation. She tells me that she had no complications but that it was a fast delivery. She had a vaginal delivery with her daughter in August 2010. Again, she says she delivered at 38 weeks gestation with no complications. She had both vaginal deliveries in [place], [place]. Neither of her children live with her.  She signed releases for us to get her delivery notes.   The patient tells me that she had labs done at [facility] in 2020 and was told that she had Hepatitis C. She was supposed to see a Gastroenterologist at [facility]. She has not kept her appointments with them. She also tells me that she has Lupus and has history of chicken pox. The patient says she does not have a history of anxiety or depression. She filled out the depression screening questionnaire and scored 3.   A newob bag is given. The 1st trimester teaching was done with the patient. We discussed a healthy diet and exercise and what is recommended. I also discussed Listeriosis and Toxoplasmosis and what fish to avoid due to high mercury levels. I informed patient not to be in hot tubs, saunas, or tanning beds. We discussed that spotting may occur after intercourse which is common, but if heavy bleeding like a period occurs to [facility] or [facility] if clinic is closed.  I encouraged her to make an appointment with the dentist if she has not had a dental exam and cleaning in the last 6 months.  I instructed the patient that alcohol, illicit drug use, and tobacco smoking are not recommended in  pregnancy. She says she is trying to quit smoking. She now smokes 1/2 pack cigarettes daily. She says she has a history of drug use. She tells me that she last used Meth about 2 weeks ago. She says she is doing good and not having any problems.  She is unsure if she wants to breastfeed.  I encouraged the patient to get the TDAP vaccine in the 3rd trimester.  I discussed with the patient that a pediatrician needs to be chosen prior to delivery for the infant to have an appointment scheduled before leaving the hospital.  I discussed lab tests will be done today. She says she had a pap smear at [facility] in 2020. She signed a release for us to get the records. All questions were answered at this time. She is scheduled for an ultrasound and to see Lay JETER on 8/24/22.

## 2022-08-04 PROBLEM — O98.419 CHRONIC HEPATITIS C COMPLICATING PREGNANCY, ANTEPARTUM (HCC): Status: ACTIVE | Noted: 2022-08-04

## 2022-08-04 PROBLEM — B18.2 CHRONIC HEPATITIS C COMPLICATING PREGNANCY, ANTEPARTUM: Status: ACTIVE | Noted: 2022-08-04

## 2022-08-04 LAB
BACTERIA SPEC AEROBE CULT: NORMAL
C TRACH RRNA CVX QL NAA+PROBE: NEGATIVE
N GONORRHOEA RRNA SPEC QL NAA+PROBE: NEGATIVE
RPR SER QL: NORMAL
RUBV IGG SERPL IA-ACNC: 5.02 INDEX
TRICHOMONAS VAGINALIS PCR: NEGATIVE

## 2022-08-05 LAB
HCV RNA SERPL NAA+PROBE-ACNC: NORMAL IU/ML
HCV RNA SERPL NAA+PROBE-LOG IU: 5.11 LOG10 IU/ML
TEST INFORMATION: NORMAL

## 2022-08-11 ENCOUNTER — REFERRAL TRIAGE (OUTPATIENT)
Dept: LABOR AND DELIVERY | Facility: HOSPITAL | Age: 37
End: 2022-08-11

## 2022-08-12 ENCOUNTER — TELEPHONE (OUTPATIENT)
Dept: OBSTETRICS AND GYNECOLOGY | Facility: CLINIC | Age: 37
End: 2022-08-12

## 2022-08-12 NOTE — TELEPHONE ENCOUNTER
Tried to call patient about a referral that was placed in our office, patient did not answer and I couldn't leave a voicemail. Patient is scheduled for a new ob appointment with [facility] for 08/24.

## 2022-08-24 ENCOUNTER — LAB (OUTPATIENT)
Dept: LAB | Facility: HOSPITAL | Age: 37
End: 2022-08-24

## 2022-08-24 ENCOUNTER — INITIAL PRENATAL (OUTPATIENT)
Dept: OBSTETRICS AND GYNECOLOGY | Facility: CLINIC | Age: 37
End: 2022-08-24

## 2022-08-24 VITALS — BODY MASS INDEX: 28.87 KG/M2 | WEIGHT: 163 LBS | SYSTOLIC BLOOD PRESSURE: 108 MMHG | DIASTOLIC BLOOD PRESSURE: 60 MMHG

## 2022-08-24 DIAGNOSIS — O98.419 CHRONIC HEPATITIS C COMPLICATING PREGNANCY, ANTEPARTUM: ICD-10-CM

## 2022-08-24 DIAGNOSIS — M32.9 SYSTEMIC LUPUS ERYTHEMATOSUS COMPLICATING PREGNANCY IN FIRST TRIMESTER: ICD-10-CM

## 2022-08-24 DIAGNOSIS — O09.521 MULTIGRAVIDA OF ADVANCED MATERNAL AGE IN FIRST TRIMESTER: ICD-10-CM

## 2022-08-24 DIAGNOSIS — Z3A.13 13 WEEKS GESTATION OF PREGNANCY: Primary | ICD-10-CM

## 2022-08-24 DIAGNOSIS — O09.91 HIGH-RISK PREGNANCY IN FIRST TRIMESTER: ICD-10-CM

## 2022-08-24 DIAGNOSIS — Z13.79 ENCOUNTER FOR GENETIC SCREENING FOR DOWN SYNDROME: ICD-10-CM

## 2022-08-24 DIAGNOSIS — O99.331 MATERNAL TOBACCO USE IN FIRST TRIMESTER: ICD-10-CM

## 2022-08-24 DIAGNOSIS — O99.891 SYSTEMIC LUPUS ERYTHEMATOSUS COMPLICATING PREGNANCY IN FIRST TRIMESTER: ICD-10-CM

## 2022-08-24 DIAGNOSIS — O99.321 DRUG USE COMPLICATING PREGNANCY IN FIRST TRIMESTER: ICD-10-CM

## 2022-08-24 DIAGNOSIS — B18.2 CHRONIC HEPATITIS C COMPLICATING PREGNANCY, ANTEPARTUM: ICD-10-CM

## 2022-08-24 PROCEDURE — 36415 COLL VENOUS BLD VENIPUNCTURE: CPT | Performed by: NURSE PRACTITIONER

## 2022-08-24 PROCEDURE — 80053 COMPREHEN METABOLIC PANEL: CPT | Performed by: NURSE PRACTITIONER

## 2022-08-24 PROCEDURE — 99214 OFFICE O/P EST MOD 30 MIN: CPT | Performed by: NURSE PRACTITIONER

## 2022-08-24 RX ORDER — ASPIRIN 81 MG/1
81 TABLET ORAL DAILY
Qty: 90 TABLET | Refills: 1 | Status: SHIPPED | OUTPATIENT
Start: 2022-08-24 | End: 2023-03-08 | Stop reason: HOSPADM

## 2022-08-24 NOTE — PROGRESS NOTES
[facility]  Obstetrics  Date of Service: 2022    CHIEF COMPLAINT:  New prenatal visit    HISTORY OF PRESENT ILLNESS:  Latisha Turner is a 37 y.o. y/o  at 13w2d by LMP (Patient's last menstrual period was 05/15/2022.).  This was not a planned pregnancy and the patient is supported by significant other, but he stepped outside.  She denies any vaginal bleeding.  She has started taking a prenatal vitamin.    REVIEW OF SYSTEMS  Review of Systems   Constitutional: Negative for activity change, appetite change, chills, diaphoresis, fatigue, fever, unexpected weight gain and unexpected weight loss.   Respiratory: Negative for apnea, chest tightness and shortness of breath.    Cardiovascular: Negative for chest pain and palpitations.   Gastrointestinal: Negative for abdominal distention, abdominal pain, constipation and diarrhea.   Genitourinary: Negative for amenorrhea, breast discharge, breast lump, breast pain, decreased libido, decreased urine volume, difficulty urinating, dyspareunia, dysuria, flank pain, frequency, genital sores, hematuria, pelvic pain, pelvic pressure, urgency, urinary incontinence, vaginal bleeding, vaginal discharge and vaginal pain.   Musculoskeletal: Negative for myalgias.   Skin: Negative for color change, dry skin and skin lesions.   Neurological: Negative for light-headedness and headache.   Psychiatric/Behavioral: Negative for agitation, dysphoric mood, sleep disturbance, suicidal ideas, depressed mood and stress. The patient is not nervous/anxious.        PRENATAL RISK FACTORS   Problems (from 22 to present)     Problem Noted Resolved    High-risk pregnancy in first trimester 2022 by Lay Hunt, CORONA No    Multigravida of advanced maternal age in first trimester 2022 by Lay Hunt, CORONA No    Systemic lupus erythematosus complicating pregnancy in first trimester (HCC) 2022 by Lay Hunt  CORONA Daniel No    Drug use complicating pregnancy in first trimester 2022 by Lay Hunt APRN No    Maternal tobacco use in first trimester 2022 by Lay Hunt APRN No    Chronic hepatitis C complicating pregnancy, antepartum (HCC) 2022 by Ernestine Dyson APRN No    Overview Signed 2022  8:13 AM by Ernestine Dyson APRN     Has not been treated; needs referral to GI for tx after delivery         Chronic hepatitis C without hepatic coma (HCC) 2020 by Alethea Ocampo APRN No          DATING CRITERIA:  LMP (05/15/2022) -- PURVI 2023  1TUS (2022 at 13w2d) -- PURVI 2023    OBSTETRIC HISTORY:  OB History    Para Term  AB Living   3 2 2     2   SAB IAB Ectopic Molar Multiple Live Births             2      # Outcome Date GA Lbr Juan/2nd Weight Sex Delivery Anes PTL Lv   3 Current            2 Term 08/06/10 38w4d  3459 g (7 lb 10 oz) F Vag-Spont EPI  LESLEY      Complications: Smoker   1 Term 09 39w1d  3289 g (7 lb 4 oz) M Vag-Spont EPI  LESLEY      Birth Comments: nuchal cord x 1.  unknown GBS      Complications: Meconium stained amniotic fluid, PROM (premature rupture of membranes)     GYN HISTORY:    Denies h/o abnormal pap smears  Last pap smear:   Last Completed Pap Smear          PAP SMEAR (Every 3 Years) Next due on 6/10/2023    06/10/2020  SCANNED - PAP SMEAR              Denies h/o gynecologic surgeries, including biopsies of the cervix    PAST MEDICAL HISTORY:  Past Medical History:   Diagnosis Date   • Abnormal Pap smear of cervix    • Chronic hepatitis C (HCC)    • Lupus (HCC)    • Substance abuse (HCC)    • Trauma    • Varicella      PAST SURGICAL HISTORY:  Past Surgical History:   Procedure Laterality Date   • WISDOM TOOTH EXTRACTION     • WRIST FRACTURE SURGERY Left      FAMILY HISTORY:  Family History   Problem Relation Age of Onset   • Diabetes Mother    • Thyroid disease Mother    • No Known Problems Daughter    • No Known  Problems Son    • Diabetes Maternal Grandmother    • Heart disease Maternal Grandmother    • Kidney failure Maternal Grandmother    • Cancer Maternal Grandfather    • Colon cancer Neg Hx    • Colon polyps Neg Hx    • Esophageal cancer Neg Hx    • Liver cancer Neg Hx    • Liver disease Neg Hx    • Rectal cancer Neg Hx    • Stomach cancer Neg Hx      SOCIAL HISTORY:  Social History     Socioeconomic History   • Marital status: Significant Other   Tobacco Use   • Smoking status: Current Every Day Smoker     Packs/day: 0.50     Types: Cigarettes   • Smokeless tobacco: Never Used   Vaping Use   • Vaping Use: Former   Substance and Sexual Activity   • Alcohol use: Not Currently   • Drug use: Not Currently     Types: Methamphetamines   • Sexual activity: Yes     Partners: Male     Comment: last pap smear negative 6/10/20 at [facility]     GENETIC SCREENING:  Age >36 yo as of PURVI: yes  Thalassemia: no  NTD: no  CHD: no  Down Syndrome/MR/Fragile X/Autism: no  Ashkenazi Buddhism with Junior-Sachs, [place], familial dysautonomia: no  Sickle cell disease or trait: no  Hemophilia: no  Muscular dystrophy: no  Cystic fibrosis: no  Ravenel's chorea: no  Birth defects: no  Genetic/chromosomal disorders: yes, SLE    INFECTION HISTORY:  TB exposure: no  HSV: no  Illness since LMP: no  Prior GBS infected child: no  STIs: no    ALLERGIES:  No Known Allergies    MEDICATIONS:  Prior to Admission medications    Medication Sig Start Date End Date Taking? Authorizing Provider   prenatal vitamin (prenatal, CLASSIC, vitamin) tablet Take  by mouth Daily.   Yes Provider, MD Saeid   aspirin (aspirin) 81 MG EC tablet Take 1 tablet by mouth Daily. 8/24/22   Lay Hunt APRN       PHYSICAL EXAM:   /60   Wt 73.9 kg (163 lb)   LMP 05/15/2022   BMI 28.87 kg/m²   General: Alert, healthy, no distress, well nourished and well developed.  Neurologic: Alert, oriented to person, place, and time.  Gait normal.   Cranial nerves II-XII grossly intact.  HEENT: Normocephalic, atraumatic.  Extraocular muscles intact, pupils equal and reactive x2.    Teeth: Normal hygiene.  Neck: Supple, no adenopathy, thyroid normal size, non-tender, without nodularity, trachea midline.  Lungs: Normal respiratory effort.  Clear to auscultation bilaterally.  No wheezes, rhonci, or rales.  Heart: Regular rate and rhythm.  No murmer, rub or gallop.  Abdomen: Soft, non-tender, non-distended,no masses, no hepatosplenomegaly, no hernia.  Skin: No rash, no lesions.  Extremities: No cyanosis, clubbing or edema.      Preliminary TVUS- single IUP measuring 13w2d,  bpm, gestational sac seen, bilateral ovaries wnl    IMPRESSION:  Latisha Turner is a 37 y.o.  at 13w2d for a new prenatal visit.    PLAN:  1.  IUP at 13w2d  - Prenatal labs reviewed  - Genetic testing, including cystic fibrosis, was discussed and patient desires NIPS with gender  - Continue prenatal vitamins  - Weight gain counseling performed.   - Pregravid BMI 18.5-24.9: Recommend 25-35 lb  - Return to clinic in 4 weeks for return prenatal visit  - Need MFM consultation with fetal anatomy scan due to SLE, AMA: will arrange and call pt with information   - Reviewed COVID-19 visitation policy  - Reviewed COVID-19 precautions     Diagnosis Plan   1. 13 weeks gestation of pregnancy     2. High-risk pregnancy in first trimester     3. Multigravida of advanced maternal age in first trimester     4. Systemic lupus erythematosus complicating pregnancy in first trimester (HCC)  Comprehensive Metabolic Panel    Protein, Urine, 24 Hour - Urine, Clean Catch    aspirin (aspirin) 81 MG EC tablet   5. Drug use complicating pregnancy in first trimester  Pt used meth last middle of July, but has not since then   6. Chronic hepatitis C complicating pregnancy, antepartum (HCC)  Will need to see GI pp    7. Encounter for genetic screening for Down Syndrome  Panorama Prenatal Screen - Blood,   8.  Maternal tobacco use in first trimester  Encouraged complete smoking cessation       CORONA Rainey  8/25/2022  12:55 CDT

## 2022-08-25 PROBLEM — O09.521 MULTIGRAVIDA OF ADVANCED MATERNAL AGE IN FIRST TRIMESTER: Status: ACTIVE | Noted: 2022-08-25

## 2022-08-25 PROBLEM — M32.9: Status: ACTIVE | Noted: 2022-08-25

## 2022-08-25 PROBLEM — O99.891: Status: ACTIVE | Noted: 2022-08-25

## 2022-08-25 PROBLEM — O09.91 HIGH-RISK PREGNANCY IN FIRST TRIMESTER: Status: ACTIVE | Noted: 2022-08-25

## 2022-08-25 PROBLEM — O99.321 DRUG USE COMPLICATING PREGNANCY IN FIRST TRIMESTER: Status: ACTIVE | Noted: 2022-08-25

## 2022-08-25 PROBLEM — O99.331 MATERNAL TOBACCO USE IN FIRST TRIMESTER: Status: ACTIVE | Noted: 2022-08-25

## 2022-08-25 LAB
ALBUMIN SERPL-MCNC: 3.7 G/DL (ref 3.5–5.2)
ALBUMIN/GLOB SERPL: 1.4 G/DL
ALP SERPL-CCNC: 52 U/L (ref 39–117)
ALT SERPL W P-5'-P-CCNC: 43 U/L (ref 1–33)
ANION GAP SERPL CALCULATED.3IONS-SCNC: 9.3 MMOL/L (ref 5–15)
AST SERPL-CCNC: 24 U/L (ref 1–32)
BILIRUB SERPL-MCNC: <0.2 MG/DL (ref 0–1.2)
BUN SERPL-MCNC: 9 MG/DL (ref 6–20)
BUN/CREAT SERPL: 11.1 (ref 7–25)
CALCIUM SPEC-SCNC: 8.9 MG/DL (ref 8.6–10.5)
CHLORIDE SERPL-SCNC: 103 MMOL/L (ref 98–107)
CO2 SERPL-SCNC: 24.7 MMOL/L (ref 22–29)
CREAT SERPL-MCNC: 0.81 MG/DL (ref 0.57–1)
EGFRCR SERPLBLD CKD-EPI 2021: 96.6 ML/MIN/1.73
GLOBULIN UR ELPH-MCNC: 2.7 GM/DL
GLUCOSE SERPL-MCNC: 89 MG/DL (ref 65–99)
POTASSIUM SERPL-SCNC: 4 MMOL/L (ref 3.5–5.2)
PROT SERPL-MCNC: 6.4 G/DL (ref 6–8.5)
SODIUM SERPL-SCNC: 137 MMOL/L (ref 136–145)

## 2022-08-31 ENCOUNTER — TELEPHONE (OUTPATIENT)
Dept: OBSTETRICS AND GYNECOLOGY | Facility: CLINIC | Age: 37
End: 2022-08-31

## 2022-08-31 NOTE — TELEPHONE ENCOUNTER
Tried calling pt with no response, left VM for her to return our call.  [facility] pt picked up 24 hour urine at 12 pm yesterday then dropped it back off today at 10:45am.  We will need full 24 hour urine specimen for accurate results.

## 2022-09-23 ENCOUNTER — ROUTINE PRENATAL (OUTPATIENT)
Dept: OBSTETRICS AND GYNECOLOGY | Facility: CLINIC | Age: 37
End: 2022-09-23

## 2022-09-23 VITALS — DIASTOLIC BLOOD PRESSURE: 62 MMHG | WEIGHT: 167 LBS | SYSTOLIC BLOOD PRESSURE: 118 MMHG | BODY MASS INDEX: 29.58 KG/M2

## 2022-09-23 DIAGNOSIS — O98.419 CHRONIC HEPATITIS C COMPLICATING PREGNANCY, ANTEPARTUM: ICD-10-CM

## 2022-09-23 DIAGNOSIS — Z36.89 ENCOUNTER FOR FETAL ANATOMIC SURVEY: ICD-10-CM

## 2022-09-23 DIAGNOSIS — O09.521 MULTIGRAVIDA OF ADVANCED MATERNAL AGE IN FIRST TRIMESTER: ICD-10-CM

## 2022-09-23 DIAGNOSIS — B18.2 CHRONIC HEPATITIS C COMPLICATING PREGNANCY, ANTEPARTUM: ICD-10-CM

## 2022-09-23 DIAGNOSIS — O99.332 MATERNAL TOBACCO USE IN SECOND TRIMESTER: ICD-10-CM

## 2022-09-23 DIAGNOSIS — O99.891 SYSTEMIC LUPUS ERYTHEMATOSUS AFFECTING PREGNANCY IN SECOND TRIMESTER: ICD-10-CM

## 2022-09-23 DIAGNOSIS — O09.92 HIGH-RISK PREGNANCY IN SECOND TRIMESTER: Primary | ICD-10-CM

## 2022-09-23 DIAGNOSIS — B18.2 CHRONIC HEPATITIS C WITHOUT HEPATIC COMA: ICD-10-CM

## 2022-09-23 DIAGNOSIS — O09.522 MULTIGRAVIDA OF ADVANCED MATERNAL AGE IN SECOND TRIMESTER: ICD-10-CM

## 2022-09-23 DIAGNOSIS — M32.9 SYSTEMIC LUPUS ERYTHEMATOSUS AFFECTING PREGNANCY IN SECOND TRIMESTER: ICD-10-CM

## 2022-09-23 DIAGNOSIS — O99.322 DRUG USE COMPLICATING PREGNANCY IN SECOND TRIMESTER: ICD-10-CM

## 2022-09-23 PROCEDURE — 99213 OFFICE O/P EST LOW 20 MIN: CPT | Performed by: NURSE PRACTITIONER

## 2022-09-23 NOTE — PROGRESS NOTES
CC: Prenatal visit    Laitsha Turner is a 37 y.o.  at 17w4d.  Doing well.  Denies contractions, LOF, or VB.  Was previously recommend to have her anatomy U/S and MFM referral with [facility] or [facility]. Pt voices that she does not feel it is necessary for her to see a high risk doctor this pregnancy as she feels her lupus has not been an issue. Pt also voices that she does feel she needs to take ASA as her blood pressure is fine. Counseled patient our reasoning for her MFM referral and baby ASA is for prevention of Pre-E because having lupus increases her risk for developing Pre-E. Of course, discussed that she has the right to decline care that she does want to do. Pt declines MFM referral at this time.     /62   Wt 75.8 kg (167 lb)   LMP 05/15/2022   BMI 29.58 kg/m²   SVE: Deferred     Fetal Heart Rate: +Vscan     Problems (from 22 to present)     Problem Noted Resolved    High-risk pregnancy in second trimester 2022 by Lay Hunt APRN No    Multigravida of advanced maternal age in second trimester 2022 by Lay Hunt APRN No    Overview Signed 2022 12:05 PM by Mary Naranjo APRN     NIPS: low risk male fetus         Systemic lupus erythematosus complicating pregnancy in first trimester (HCC) 2022 by Lay Hunt APRN No    Overview Signed 2022 11:57 AM by Mary Naranjo APRN     Pt states Lupus has not been a problem for her in 2 years. Declines MFM referral.   Does not want feel she needs ASA for prevention of Pre-E.          Drug use complicating pregnancy in first trimester 2022 by Lay Hunt APRN No    Maternal tobacco use in second trimester 2022 by Lay Hunt APRN No    Chronic hepatitis C complicating pregnancy, antepartum (HCC) 2022 by Ernestine Dyson APRN No    Overview Signed 2022  8:13 AM by Ernestine Dyson APRN     Has not been treated; needs  referral to GI for tx after delivery         Chronic hepatitis C without hepatic coma (HCC) 2020 by Alethea Ocampo, APRN No          A/P: Latisha Turner is a 37 y.o.  at 17w4d.  - Anatomy US in 3 weeks in MAD; pt declines MFM referral  - Cramping precautions  - RTC in 3 weeks     Diagnosis Plan   1. High-risk pregnancy in second trimester  US Ob 14 + Weeks Single or First Gestation   2. Multigravida of advanced maternal age in first trimester  US Ob 14 + Weeks Single or First Gestation   3. Systemic lupus erythematosus affecting pregnancy in second trimester (HCC)  US Ob 14 + Weeks Single or First Gestation   4. Drug use complicating pregnancy in second trimester  US Ob 14 + Weeks Single or First Gestation   5. Chronic hepatitis C complicating pregnancy, antepartum (HCC)  US Ob 14 + Weeks Single or First Gestation   6. Chronic hepatitis C without hepatic coma (HCC)     7. Maternal tobacco use in second trimester  US Ob 14 + Weeks Single or First Gestation   8. Encounter for fetal anatomic survey  US Ob 14 + Weeks Single or First Gestation   9. Multigravida of advanced maternal age in second trimester       Mary Callahan, APRN  2022  12:06 CDT

## 2022-10-12 ENCOUNTER — ROUTINE PRENATAL (OUTPATIENT)
Dept: OBSTETRICS AND GYNECOLOGY | Facility: CLINIC | Age: 37
End: 2022-10-12

## 2022-10-12 VITALS — BODY MASS INDEX: 30.11 KG/M2 | SYSTOLIC BLOOD PRESSURE: 108 MMHG | DIASTOLIC BLOOD PRESSURE: 54 MMHG | WEIGHT: 170 LBS

## 2022-10-12 DIAGNOSIS — O98.419 CHRONIC HEPATITIS C COMPLICATING PREGNANCY, ANTEPARTUM: ICD-10-CM

## 2022-10-12 DIAGNOSIS — B18.2 CHRONIC HEPATITIS C WITHOUT HEPATIC COMA: ICD-10-CM

## 2022-10-12 DIAGNOSIS — M32.9 SYSTEMIC LUPUS ERYTHEMATOSUS COMPLICATING PREGNANCY IN FIRST TRIMESTER: ICD-10-CM

## 2022-10-12 DIAGNOSIS — O99.332 MATERNAL TOBACCO USE IN SECOND TRIMESTER: ICD-10-CM

## 2022-10-12 DIAGNOSIS — O09.92 HIGH-RISK PREGNANCY IN SECOND TRIMESTER: Primary | ICD-10-CM

## 2022-10-12 DIAGNOSIS — B18.2 CHRONIC HEPATITIS C COMPLICATING PREGNANCY, ANTEPARTUM: ICD-10-CM

## 2022-10-12 DIAGNOSIS — O99.321 DRUG USE COMPLICATING PREGNANCY IN FIRST TRIMESTER: ICD-10-CM

## 2022-10-12 DIAGNOSIS — O99.891 SYSTEMIC LUPUS ERYTHEMATOSUS COMPLICATING PREGNANCY IN FIRST TRIMESTER: ICD-10-CM

## 2022-10-12 DIAGNOSIS — O09.522 MULTIGRAVIDA OF ADVANCED MATERNAL AGE IN SECOND TRIMESTER: ICD-10-CM

## 2022-10-12 PROCEDURE — 99214 OFFICE O/P EST MOD 30 MIN: CPT | Performed by: STUDENT IN AN ORGANIZED HEALTH CARE EDUCATION/TRAINING PROGRAM

## 2022-10-12 NOTE — PROGRESS NOTES
CC: Prenatal visit    Latisha Turner is a 37 y.o.  at 20w2d.  Doing well.  Denies contractions, LOF, or VB.  Reports good FM.    Again discussed Lupus-declines referral to MFM or plaquenil, stating did not do well on plaquenil previously, no recent flare    /54   Wt 77.1 kg (170 lb)   LMP 05/15/2022   BMI 30.11 kg/m²   SVE: deferred     Fetal Heart Rate: 147 us     Prelim US: breech, posterior placenta, HOPE 13.7 cm,  bpm, BPD 38.5%, HC 17.9%, AC 32.6%, FL 14.9%, CL 4.43 cm, all anatomy seen and normal, boy    A/P: Latisha Turner is a 37 y.o.  at 20w2d.  - RTC in 4  weeks  - Reviewed COVID-19 visitation policy  - Reviewed COVID-19 precautions    Problem List Items Addressed This Visit        Gastrointestinal Abdominal     Chronic hepatitis C without hepatic coma (HCC)       Gravid and     Chronic hepatitis C complicating pregnancy, antepartum (HCC)    Overview     Has not been treated; needs referral to GI for tx after delivery         High-risk pregnancy in second trimester - Primary    Multigravida of advanced maternal age in second trimester    Overview     NIPS: low risk male fetus         Systemic lupus erythematosus complicating pregnancy in first trimester (HCC)    Overview     Pt states Lupus has not been a problem for her in 2 years. Declines MFM referral.   Declines ASA for prevention of Pre-E.  Declines Plaquenil.         Drug use complicating pregnancy in first trimester    Overview     Reports meth use early pregnancy  Neg UDS at IOB            Tobacco    Maternal tobacco use in second trimester          Diagnosis Plan   1. High-risk pregnancy in second trimester        2. Multigravida of advanced maternal age in second trimester        3. Systemic lupus erythematosus complicating pregnancy in first trimester (HCC)        4. Drug use complicating pregnancy in first trimester        5. Chronic hepatitis C complicating pregnancy, antepartum (HCC)        6. Chronic  hepatitis C without hepatic coma (HCC)        7. Maternal tobacco use in second trimester          Shameka Thomas DO  10/30/2022  20:32 CDT

## 2022-11-08 ENCOUNTER — ROUTINE PRENATAL (OUTPATIENT)
Dept: OBSTETRICS AND GYNECOLOGY | Facility: CLINIC | Age: 37
End: 2022-11-08

## 2022-11-08 VITALS — DIASTOLIC BLOOD PRESSURE: 58 MMHG | WEIGHT: 177 LBS | SYSTOLIC BLOOD PRESSURE: 106 MMHG | BODY MASS INDEX: 31.35 KG/M2

## 2022-11-08 DIAGNOSIS — O99.891 SYSTEMIC LUPUS ERYTHEMATOSUS COMPLICATING PREGNANCY IN FIRST TRIMESTER: ICD-10-CM

## 2022-11-08 DIAGNOSIS — O99.332 MATERNAL TOBACCO USE IN SECOND TRIMESTER: ICD-10-CM

## 2022-11-08 DIAGNOSIS — Z3A.24 24 WEEKS GESTATION OF PREGNANCY: Primary | ICD-10-CM

## 2022-11-08 DIAGNOSIS — O99.321 DRUG USE COMPLICATING PREGNANCY IN FIRST TRIMESTER: ICD-10-CM

## 2022-11-08 DIAGNOSIS — M32.9 SYSTEMIC LUPUS ERYTHEMATOSUS COMPLICATING PREGNANCY IN FIRST TRIMESTER: ICD-10-CM

## 2022-11-08 DIAGNOSIS — O09.522 MULTIGRAVIDA OF ADVANCED MATERNAL AGE IN SECOND TRIMESTER: ICD-10-CM

## 2022-11-08 DIAGNOSIS — O98.419 CHRONIC HEPATITIS C COMPLICATING PREGNANCY, ANTEPARTUM: ICD-10-CM

## 2022-11-08 DIAGNOSIS — B18.2 CHRONIC HEPATITIS C WITHOUT HEPATIC COMA: ICD-10-CM

## 2022-11-08 DIAGNOSIS — O09.92 HIGH-RISK PREGNANCY IN SECOND TRIMESTER: ICD-10-CM

## 2022-11-08 DIAGNOSIS — B18.2 CHRONIC HEPATITIS C COMPLICATING PREGNANCY, ANTEPARTUM: ICD-10-CM

## 2022-11-08 PROCEDURE — 99213 OFFICE O/P EST LOW 20 MIN: CPT | Performed by: NURSE PRACTITIONER

## 2022-11-08 NOTE — PROGRESS NOTES
CC: Prenatal visit    Latisha Turner is a 37 y.o.  at 24w1d.  Doing well.  No complaints.  Denies contractions, LOF, or VB.  Reports good FM.    /58   Wt 80.3 kg (177 lb)   LMP 05/15/2022   BMI 31.35 kg/m²              Problems (from 22 to present)     Problem Noted Resolved    High-risk pregnancy in second trimester 2022 by Lay Hunt APRN No    Multigravida of advanced maternal age in second trimester 2022 by Lay Hunt APRN No    Overview Signed 2022 12:05 PM by Mary Naranjo APRN     NIPS: low risk male fetus         Systemic lupus erythematosus complicating pregnancy in first trimester (HCC) 2022 by Lay Hunt APRN No    Overview Addendum 10/30/2022  8:30 PM by Shameka Thomas DO     Pt states Lupus has not been a problem for her in 2 years. Declines MFM referral.   Declines ASA for prevention of Pre-E.  Declines Plaquenil.         Drug use complicating pregnancy in first trimester 2022 by Lay Hunt APRN No    Overview Signed 10/30/2022  8:32 PM by Shameka Thomas DO     Reports meth use early pregnancy  Neg UDS at IOB         Maternal tobacco use in second trimester 2022 by Lay Hunt APRN No    Chronic hepatitis C complicating pregnancy, antepartum (HCC) 2022 by Ernestine Dyson APRN No    Overview Signed 2022  8:13 AM by Ernestine Dyson APRN     Has not been treated; needs referral to GI for tx after delivery         Chronic hepatitis C without hepatic coma (HCC) 2020 by Alethea Ocampo APRN No          A/P: Latisha Turner is a 37 y.o.  at 24w1d.  Discussed Tdap, pt had [facility] along with flu shot  3T labs to be done at next appointment  - RTC in 4 weeks     Diagnosis Plan   1. 24 weeks gestation of pregnancy        2. High-risk pregnancy in second trimester        3. Multigravida of advanced maternal age in second trimester         4. Systemic lupus erythematosus complicating pregnancy in first trimester (HCC)        5. Drug use complicating pregnancy in first trimester        6. Chronic hepatitis C complicating pregnancy, antepartum (HCC)        7. Chronic hepatitis C without hepatic coma (HCC)        8. Maternal tobacco use in second trimester            Lay Hunt, CORONA  11/8/2022  14:35 CST

## 2022-12-05 ENCOUNTER — LAB (OUTPATIENT)
Dept: LAB | Facility: HOSPITAL | Age: 37
End: 2022-12-05

## 2022-12-05 ENCOUNTER — ROUTINE PRENATAL (OUTPATIENT)
Dept: OBSTETRICS AND GYNECOLOGY | Facility: CLINIC | Age: 37
End: 2022-12-05

## 2022-12-05 VITALS — DIASTOLIC BLOOD PRESSURE: 64 MMHG | BODY MASS INDEX: 32.42 KG/M2 | SYSTOLIC BLOOD PRESSURE: 106 MMHG | WEIGHT: 183 LBS

## 2022-12-05 DIAGNOSIS — O09.92 HIGH-RISK PREGNANCY IN SECOND TRIMESTER: ICD-10-CM

## 2022-12-05 DIAGNOSIS — M32.9 SYSTEMIC LUPUS ERYTHEMATOSUS COMPLICATING PREGNANCY IN FIRST TRIMESTER: ICD-10-CM

## 2022-12-05 DIAGNOSIS — O09.522 MULTIGRAVIDA OF ADVANCED MATERNAL AGE IN SECOND TRIMESTER: ICD-10-CM

## 2022-12-05 DIAGNOSIS — B18.2 CHRONIC HEPATITIS C COMPLICATING PREGNANCY, ANTEPARTUM: ICD-10-CM

## 2022-12-05 DIAGNOSIS — Z13.1 SCREENING FOR DIABETES MELLITUS: ICD-10-CM

## 2022-12-05 DIAGNOSIS — Z36.9 ANTENATAL SCREENING ENCOUNTER: ICD-10-CM

## 2022-12-05 DIAGNOSIS — B18.2 CHRONIC HEPATITIS C WITHOUT HEPATIC COMA: ICD-10-CM

## 2022-12-05 DIAGNOSIS — O99.332 MATERNAL TOBACCO USE IN SECOND TRIMESTER: ICD-10-CM

## 2022-12-05 DIAGNOSIS — Z3A.28 28 WEEKS GESTATION OF PREGNANCY: Primary | ICD-10-CM

## 2022-12-05 DIAGNOSIS — O99.321 DRUG USE COMPLICATING PREGNANCY IN FIRST TRIMESTER: ICD-10-CM

## 2022-12-05 DIAGNOSIS — O98.419 CHRONIC HEPATITIS C COMPLICATING PREGNANCY, ANTEPARTUM: ICD-10-CM

## 2022-12-05 DIAGNOSIS — O99.891 SYSTEMIC LUPUS ERYTHEMATOSUS COMPLICATING PREGNANCY IN FIRST TRIMESTER: ICD-10-CM

## 2022-12-05 DIAGNOSIS — Z13.1 SCREENING FOR DIABETES MELLITUS: Primary | ICD-10-CM

## 2022-12-05 LAB
DEPRECATED RDW RBC AUTO: 38.7 FL (ref 37–54)
ERYTHROCYTE [DISTWIDTH] IN BLOOD BY AUTOMATED COUNT: 12.3 % (ref 12.3–15.4)
HCT VFR BLD AUTO: 33.7 % (ref 34–46.6)
HGB BLD-MCNC: 11.9 G/DL (ref 12–15.9)
MCH RBC QN AUTO: 30.4 PG (ref 26.6–33)
MCHC RBC AUTO-ENTMCNC: 35.3 G/DL (ref 31.5–35.7)
MCV RBC AUTO: 86.2 FL (ref 79–97)
PLATELET # BLD AUTO: 391 10*3/MM3 (ref 140–450)
PMV BLD AUTO: 8.9 FL (ref 6–12)
RBC # BLD AUTO: 3.91 10*6/MM3 (ref 3.77–5.28)
WBC NRBC COR # BLD: 9.12 10*3/MM3 (ref 3.4–10.8)

## 2022-12-05 PROCEDURE — 99213 OFFICE O/P EST LOW 20 MIN: CPT

## 2022-12-05 PROCEDURE — 82950 GLUCOSE TEST: CPT

## 2022-12-05 PROCEDURE — 85027 COMPLETE CBC AUTOMATED: CPT

## 2022-12-05 PROCEDURE — 36415 COLL VENOUS BLD VENIPUNCTURE: CPT

## 2022-12-05 NOTE — PROGRESS NOTES
CC: Prenatal visit    Latisha Turner is a 37 y.o.  at 28w0d.  Doing well.  Denies contractions, LOF, or VB.  Reports good FM.    /64   Wt 83 kg (183 lb)   LMP 05/15/2022   BMI 32.42 kg/m²   SVE: NA  Fundal Height (cm): 28 cm  Fetal Heart Rate: 145     Problems (from 22 to present)     Problem Noted Resolved    High-risk pregnancy in second trimester 2022 by Lay Hunt APRN No    Multigravida of advanced maternal age in second trimester 2022 by Lay Hunt APRN No    Overview Signed 2022 12:05 PM by Mary Naranjo APRN     NIPS: low risk male fetus         Systemic lupus erythematosus complicating pregnancy in first trimester (HCC) 2022 by Lay Hunt APRN No    Overview Addendum 10/30/2022  8:30 PM by Shameka Thomas DO     Pt states Lupus has not been a problem for her in 2 years. Declines MFM referral.   Declines ASA for prevention of Pre-E.  Declines Plaquenil.         Drug use complicating pregnancy in first trimester 2022 by Lay Hunt APRN No    Overview Signed 10/30/2022  8:32 PM by Shameka Thomas DO     Reports meth use early pregnancy  Neg UDS at IOB         Maternal tobacco use in second trimester 2022 by Lay Hunt APRN No    Chronic hepatitis C complicating pregnancy, antepartum (HCC) 2022 by Ernestine Dyson APRN No    Overview Signed 2022  8:13 AM by Ernestine Dyson APRN     Has not been treated; needs referral to GI for tx after delivery         Chronic hepatitis C without hepatic coma (HCC) 2020 by Alethea Ocampo APRN No          A/P: Latisha Turner is a 37 y.o.  at 28w0d.  - RTC in 2 weeks  - 3T labs today and UDS  - Had Tdap at [facility]  - Reviewed COVID-19 visitation policy  - Reviewed COVID-19 precautions     Diagnosis Plan   1. 28 weeks gestation of pregnancy        2. High-risk pregnancy in second trimester        3. Multigravida of  advanced maternal age in second trimester        4. Systemic lupus erythematosus complicating pregnancy in first trimester (HCC)        5. Drug use complicating pregnancy in first trimester        6. Chronic hepatitis C complicating pregnancy, antepartum (HCC)        7. Chronic hepatitis C without hepatic coma (HCC)        8. Maternal tobacco use in second trimester        9.  screening encounter  CBC (No Diff)    Urine Drug Screen - Urine, Clean Catch        CORONA Munson  2022  09:27 CST

## 2022-12-06 LAB — GLUCOSE 1H P 100 G GLC PO SERPL-MCNC: 114 MG/DL (ref 65–139)

## 2022-12-08 ENCOUNTER — PATIENT OUTREACH (OUTPATIENT)
Dept: OBSTETRICS AND GYNECOLOGY | Facility: HOSPITAL | Age: 37
End: 2022-12-08

## 2022-12-08 NOTE — OUTREACH NOTE
[facility]  Unable to Reach       Questions/Answers    Flowsheet Row Responses   Pending Outreach Confirm Patient Interest   Call Attempt First   Outcome Left message, MyChart message sent to patient   Next Call Attempt Date 12/15/22              Amelie Vogt RN  Maternity Nurse Navigator    12/8/2022, 14:23 CST

## 2022-12-15 ENCOUNTER — PATIENT OUTREACH (OUTPATIENT)
Dept: OBSTETRICS AND GYNECOLOGY | Facility: HOSPITAL | Age: 37
End: 2022-12-15

## 2022-12-15 NOTE — OUTREACH NOTE
[facility]  Unable to Reach       Questions/Answers    Flowsheet Row Responses   Pending Outreach Confirm Patient Interest   Call Attempt Second   Outcome Left message   Unable to reach comments: will close next week if no return interest              Amelie Vogt RN  Maternity Nurse Navigator    12/15/2022, 10:20 CST

## 2023-02-02 ENCOUNTER — HOSPITAL ENCOUNTER (EMERGENCY)
Facility: HOSPITAL | Age: 38
Discharge: HOME | End: 2023-02-02
Payer: MEDICAID

## 2023-02-02 PROCEDURE — 99211 OFF/OP EST MAY X REQ PHY/QHP: CPT

## 2023-02-06 ENCOUNTER — ROUTINE PRENATAL (OUTPATIENT)
Dept: OBSTETRICS AND GYNECOLOGY | Facility: CLINIC | Age: 38
End: 2023-02-06
Payer: MEDICAID

## 2023-02-06 VITALS — DIASTOLIC BLOOD PRESSURE: 74 MMHG | WEIGHT: 203 LBS | SYSTOLIC BLOOD PRESSURE: 108 MMHG | BODY MASS INDEX: 35.96 KG/M2

## 2023-02-06 DIAGNOSIS — O99.321 DRUG USE COMPLICATING PREGNANCY IN FIRST TRIMESTER: ICD-10-CM

## 2023-02-06 DIAGNOSIS — O99.891 SYSTEMIC LUPUS ERYTHEMATOSUS COMPLICATING PREGNANCY IN FIRST TRIMESTER: ICD-10-CM

## 2023-02-06 DIAGNOSIS — O99.332 MATERNAL TOBACCO USE IN SECOND TRIMESTER: ICD-10-CM

## 2023-02-06 DIAGNOSIS — O09.92 HIGH-RISK PREGNANCY IN SECOND TRIMESTER: ICD-10-CM

## 2023-02-06 DIAGNOSIS — Z36.9 ANTENATAL SCREENING ENCOUNTER: ICD-10-CM

## 2023-02-06 DIAGNOSIS — M32.9 SYSTEMIC LUPUS ERYTHEMATOSUS COMPLICATING PREGNANCY IN FIRST TRIMESTER: ICD-10-CM

## 2023-02-06 DIAGNOSIS — O09.522 MULTIGRAVIDA OF ADVANCED MATERNAL AGE IN SECOND TRIMESTER: ICD-10-CM

## 2023-02-06 DIAGNOSIS — O98.419 CHRONIC HEPATITIS C COMPLICATING PREGNANCY, ANTEPARTUM: ICD-10-CM

## 2023-02-06 DIAGNOSIS — Z3A.37 37 WEEKS GESTATION OF PREGNANCY: Primary | ICD-10-CM

## 2023-02-06 DIAGNOSIS — B18.2 CHRONIC HEPATITIS C COMPLICATING PREGNANCY, ANTEPARTUM: ICD-10-CM

## 2023-02-06 DIAGNOSIS — B18.2 CHRONIC HEPATITIS C WITHOUT HEPATIC COMA: ICD-10-CM

## 2023-02-06 DIAGNOSIS — O09.92 HIGH-RISK PREGNANCY IN SECOND TRIMESTER: Primary | ICD-10-CM

## 2023-02-06 PROCEDURE — 99213 OFFICE O/P EST LOW 20 MIN: CPT

## 2023-02-06 RX ORDER — ALBUTEROL SULFATE 90 UG/1
2 AEROSOL, METERED RESPIRATORY (INHALATION) EVERY 6 HOURS PRN
COMMUNITY
Start: 2023-02-02 | End: 2023-02-21

## 2023-02-06 NOTE — PROGRESS NOTES
CC: Prenatal visit    Latisha Turner is a 37 y.o.  at 37w0d.  Doing well.  Denies contractions, LOF, or VB.  Reports good FM.  Reports traveling to [place] via the bus, now reports a 10 day history of cough and congestion. Reports ribs are sore and hurting from coughing. Did a [facility] visit with  and received an inhaler, it has helped some. She has also taken benadryl that has helped with a runny nose. Denies any fever.   Offered flu/covid swab, patient declined.   Discussed EIOL or waiting for natural labor. Patient would like to wait and see if natural labor happens. May discuss EIOL at next visit.   Reports she has missed several appointments due to her truck being down.   Bedside US: Cephalic.     /74   Wt 92.1 kg (203 lb)   LMP 05/15/2022   BMI 35.96 kg/m²   SVE: NA  Fundal Height (cm): 37 cm  Fetal Heart Rate: 148     Problems (from 22 to present)     Problem Noted Resolved    High-risk pregnancy in second trimester 2022 by Lay Hunt APRN No    Multigravida of advanced maternal age in second trimester 2022 by Lay Hunt APRN No    Overview Signed 2022 12:05 PM by Mary Naranjo APRN     NIPS: low risk male fetus         Systemic lupus erythematosus complicating pregnancy in first trimester (HCC) 2022 by Lay Hunt APRN No    Overview Addendum 10/30/2022  8:30 PM by Shameka Thomas DO     Pt states Lupus has not been a problem for her in 2 years. Declines MFM referral.   Declines ASA for prevention of Pre-E.  Declines Plaquenil.         Drug use complicating pregnancy in first trimester 2022 by Lay Hunt APRN No    Overview Signed 10/30/2022  8:32 PM by Shameka Thomas DO     Reports meth use early pregnancy  Neg UDS at IOB         Maternal tobacco use in second trimester 2022 by Gilbert, Lay María, APRN No    Chronic hepatitis C complicating pregnancy, antepartum (HCC)  2022 by Ernestine Dyson APRN No    Overview Signed 2022  8:13 AM by Ernestine Dyson APRN     Has not been treated; needs referral to GI for tx after delivery         Chronic hepatitis C without hepatic coma (HCC) 2020 by Alethea Ocampo APRN No          A/P: Latisha Turner is a 37 y.o.  at 37w0d.  - RTC in 1 weeks with BPP  - Reviewed COVID-19 visitation policy  - Reviewed COVID-19 precautions     Diagnosis Plan   1. 37 weeks gestation of pregnancy        2. High-risk pregnancy in second trimester        3. Multigravida of advanced maternal age in second trimester  D/t transportation issues patient unable to come to the clinic bi-weekly for  testing.       4. Systemic lupus erythematosus complicating pregnancy in first trimester (HCC)        5. Drug use complicating pregnancy in first trimester        6. Chronic hepatitis C complicating pregnancy, antepartum (HCC)        7. Chronic hepatitis C without hepatic coma (HCC)        8. Maternal tobacco use in second trimester          CORONA Munson  2023  15:45 CST

## 2023-02-13 ENCOUNTER — ROUTINE PRENATAL (OUTPATIENT)
Dept: OBSTETRICS AND GYNECOLOGY | Facility: CLINIC | Age: 38
End: 2023-02-13
Payer: MEDICAID

## 2023-02-13 VITALS — BODY MASS INDEX: 37.38 KG/M2 | WEIGHT: 211 LBS | DIASTOLIC BLOOD PRESSURE: 72 MMHG | SYSTOLIC BLOOD PRESSURE: 110 MMHG

## 2023-02-13 DIAGNOSIS — B18.2 CHRONIC HEPATITIS C WITHOUT HEPATIC COMA: ICD-10-CM

## 2023-02-13 DIAGNOSIS — B18.2 CHRONIC HEPATITIS C COMPLICATING PREGNANCY, ANTEPARTUM: ICD-10-CM

## 2023-02-13 DIAGNOSIS — O09.92 HIGH-RISK PREGNANCY IN SECOND TRIMESTER: ICD-10-CM

## 2023-02-13 DIAGNOSIS — O99.321 DRUG USE COMPLICATING PREGNANCY IN FIRST TRIMESTER: ICD-10-CM

## 2023-02-13 DIAGNOSIS — O99.332 MATERNAL TOBACCO USE IN SECOND TRIMESTER: ICD-10-CM

## 2023-02-13 DIAGNOSIS — M32.9 SYSTEMIC LUPUS ERYTHEMATOSUS COMPLICATING PREGNANCY IN FIRST TRIMESTER: ICD-10-CM

## 2023-02-13 DIAGNOSIS — O98.419 CHRONIC HEPATITIS C COMPLICATING PREGNANCY, ANTEPARTUM: ICD-10-CM

## 2023-02-13 DIAGNOSIS — O99.891 SYSTEMIC LUPUS ERYTHEMATOSUS COMPLICATING PREGNANCY IN FIRST TRIMESTER: ICD-10-CM

## 2023-02-13 DIAGNOSIS — O09.522 MULTIGRAVIDA OF ADVANCED MATERNAL AGE IN SECOND TRIMESTER: ICD-10-CM

## 2023-02-13 DIAGNOSIS — Z3A.38 38 WEEKS GESTATION OF PREGNANCY: Primary | ICD-10-CM

## 2023-02-13 PROCEDURE — 99213 OFFICE O/P EST LOW 20 MIN: CPT

## 2023-02-13 NOTE — PROGRESS NOTES
CC: Prenatal visit    Latisha Turner is a 37 y.o.  at 38w0d.  Doing well.  Denies contractions, LOF, or VB.  Reports good FM.    /72   Wt 95.7 kg (211 lb)   LMP 05/15/2022   BMI 37.38 kg/m²   SVE: NA  Fundal Height (cm): 38 cm  Fetal Heart Rate: 136     Problems (from 22 to present)     Problem Noted Resolved    High-risk pregnancy in second trimester 2022 by Lay Hunt APRN No    Multigravida of advanced maternal age in second trimester 2022 by Lay Hunt APRN No    Overview Signed 2022 12:05 PM by Mary Naranjo APRN     NIPS: low risk male fetus         Systemic lupus erythematosus complicating pregnancy in first trimester (HCC) 2022 by Lay Hunt APRN No    Overview Addendum 10/30/2022  8:30 PM by Shameka Thomas DO     Pt states Lupus has not been a problem for her in 2 years. Declines MFM referral.   Declines ASA for prevention of Pre-E.  Declines Plaquenil.         Drug use complicating pregnancy in first trimester 2022 by Lay Hunt APRN No    Overview Signed 10/30/2022  8:32 PM by Shameka Thomas DO     Reports meth use early pregnancy  Neg UDS at IOB         Maternal tobacco use in second trimester 2022 by Lay Hunt APRN No    Chronic hepatitis C complicating pregnancy, antepartum (HCC) 2022 by Ernestine Dyson APRN No    Overview Signed 2022  8:13 AM by Ernestine Dyson APRN     Has not been treated; needs referral to GI for tx after delivery         Chronic hepatitis C without hepatic coma (HCC) 2020 by Alethea Ocampo APRN No          A/P: Latisha Turner is a 37 y.o.  at 38w0d.  - RTC in 1 weeks  - Will schedule IOL at 41 weeks, pt wishes for natural labor  - Educated patient that AMA increases risk of possible still birth, pt still wishes to wait for natural labor.   - Consider UDS at next appointment   - Reviewed COVID-19 visitation  policy  - Reviewed COVID-19 precautions     Diagnosis Plan   1. 38 weeks gestation of pregnancy        2. High-risk pregnancy in second trimester        3. Multigravida of advanced maternal age in second trimester        4. Systemic lupus erythematosus complicating pregnancy in first trimester (HCC)        5. Drug use complicating pregnancy in first trimester        6. Chronic hepatitis C complicating pregnancy, antepartum (HCC)        7. Chronic hepatitis C without hepatic coma (HCC)        8. Maternal tobacco use in second trimester          Jodi Costello, APRN  2/13/2023  16:18 CST

## 2023-02-21 ENCOUNTER — ROUTINE PRENATAL (OUTPATIENT)
Dept: OBSTETRICS AND GYNECOLOGY | Facility: CLINIC | Age: 38
End: 2023-02-21
Payer: MEDICAID

## 2023-02-21 VITALS — BODY MASS INDEX: 44.39 KG/M2 | WEIGHT: 250.6 LBS | DIASTOLIC BLOOD PRESSURE: 62 MMHG | SYSTOLIC BLOOD PRESSURE: 128 MMHG

## 2023-02-21 DIAGNOSIS — O09.522 MULTIGRAVIDA OF ADVANCED MATERNAL AGE IN SECOND TRIMESTER: ICD-10-CM

## 2023-02-21 DIAGNOSIS — O99.891 SYSTEMIC LUPUS ERYTHEMATOSUS COMPLICATING PREGNANCY IN FIRST TRIMESTER: ICD-10-CM

## 2023-02-21 DIAGNOSIS — B18.2 CHRONIC HEPATITIS C WITHOUT HEPATIC COMA: ICD-10-CM

## 2023-02-21 DIAGNOSIS — O09.92 HIGH-RISK PREGNANCY IN SECOND TRIMESTER: Primary | ICD-10-CM

## 2023-02-21 DIAGNOSIS — B18.2 CHRONIC HEPATITIS C COMPLICATING PREGNANCY, ANTEPARTUM: ICD-10-CM

## 2023-02-21 DIAGNOSIS — O99.321 DRUG USE COMPLICATING PREGNANCY IN FIRST TRIMESTER: ICD-10-CM

## 2023-02-21 DIAGNOSIS — O99.332 MATERNAL TOBACCO USE IN SECOND TRIMESTER: ICD-10-CM

## 2023-02-21 DIAGNOSIS — Z36.85 SCREENING, ANTENATAL, FOR STREPTOCOCCUS B: ICD-10-CM

## 2023-02-21 DIAGNOSIS — M32.9 SYSTEMIC LUPUS ERYTHEMATOSUS COMPLICATING PREGNANCY IN FIRST TRIMESTER: ICD-10-CM

## 2023-02-21 DIAGNOSIS — O98.419 CHRONIC HEPATITIS C COMPLICATING PREGNANCY, ANTEPARTUM: ICD-10-CM

## 2023-02-21 PROCEDURE — 99214 OFFICE O/P EST MOD 30 MIN: CPT | Performed by: STUDENT IN AN ORGANIZED HEALTH CARE EDUCATION/TRAINING PROGRAM

## 2023-02-21 PROCEDURE — 87653 STREP B DNA AMP PROBE: CPT | Performed by: STUDENT IN AN ORGANIZED HEALTH CARE EDUCATION/TRAINING PROGRAM

## 2023-02-22 LAB — GROUP B STREP, DNA: NEGATIVE

## 2023-03-02 ENCOUNTER — PREP FOR SURGERY (OUTPATIENT)
Dept: OTHER | Facility: HOSPITAL | Age: 38
End: 2023-03-02
Payer: MEDICAID

## 2023-03-02 DIAGNOSIS — M32.9 SYSTEMIC LUPUS ERYTHEMATOSUS, MATERNAL, ANTEPARTUM: Primary | ICD-10-CM

## 2023-03-02 DIAGNOSIS — O99.891 SYSTEMIC LUPUS ERYTHEMATOSUS, MATERNAL, ANTEPARTUM: Primary | ICD-10-CM

## 2023-03-02 RX ORDER — OXYTOCIN/0.9 % SODIUM CHLORIDE 30/500 ML
250 PLASTIC BAG, INJECTION (ML) INTRAVENOUS CONTINUOUS
Status: CANCELLED | OUTPATIENT
Start: 2023-03-02 | End: 2023-03-02

## 2023-03-02 RX ORDER — CARBOPROST TROMETHAMINE 250 UG/ML
250 INJECTION, SOLUTION INTRAMUSCULAR AS NEEDED
Status: CANCELLED | OUTPATIENT
Start: 2023-03-02

## 2023-03-02 RX ORDER — MISOPROSTOL 200 UG/1
800 TABLET ORAL AS NEEDED
Status: CANCELLED | OUTPATIENT
Start: 2023-03-02

## 2023-03-02 RX ORDER — PROMETHAZINE HYDROCHLORIDE 12.5 MG/1
12.5 SUPPOSITORY RECTAL EVERY 6 HOURS PRN
Status: CANCELLED | OUTPATIENT
Start: 2023-03-02

## 2023-03-02 RX ORDER — SODIUM CHLORIDE 0.9 % (FLUSH) 0.9 %
3 SYRINGE (ML) INJECTION EVERY 12 HOURS SCHEDULED
Status: CANCELLED | OUTPATIENT
Start: 2023-03-02

## 2023-03-02 RX ORDER — METHYLERGONOVINE MALEATE 0.2 MG/ML
200 INJECTION INTRAVENOUS ONCE AS NEEDED
Status: CANCELLED | OUTPATIENT
Start: 2023-03-02

## 2023-03-02 RX ORDER — SODIUM CHLORIDE 0.9 % (FLUSH) 0.9 %
3-10 SYRINGE (ML) INJECTION AS NEEDED
Status: CANCELLED | OUTPATIENT
Start: 2023-03-02

## 2023-03-02 RX ORDER — OXYTOCIN/0.9 % SODIUM CHLORIDE 30/500 ML
999 PLASTIC BAG, INJECTION (ML) INTRAVENOUS ONCE
Status: CANCELLED | OUTPATIENT
Start: 2023-03-02 | End: 2023-03-02

## 2023-03-02 RX ORDER — BUTORPHANOL TARTRATE 1 MG/ML
1 INJECTION, SOLUTION INTRAMUSCULAR; INTRAVENOUS
Status: CANCELLED | OUTPATIENT
Start: 2023-03-02

## 2023-03-02 RX ORDER — PROMETHAZINE HYDROCHLORIDE 12.5 MG/1
12.5 TABLET ORAL EVERY 6 HOURS PRN
Status: CANCELLED | OUTPATIENT
Start: 2023-03-02

## 2023-03-02 RX ORDER — SODIUM CHLORIDE, SODIUM LACTATE, POTASSIUM CHLORIDE, CALCIUM CHLORIDE 600; 310; 30; 20 MG/100ML; MG/100ML; MG/100ML; MG/100ML
125 INJECTION, SOLUTION INTRAVENOUS CONTINUOUS
Status: CANCELLED | OUTPATIENT
Start: 2023-03-02

## 2023-03-02 RX ORDER — LIDOCAINE HYDROCHLORIDE 10 MG/ML
5 INJECTION, SOLUTION EPIDURAL; INFILTRATION; INTRACAUDAL; PERINEURAL AS NEEDED
Status: CANCELLED | OUTPATIENT
Start: 2023-03-02

## 2023-03-03 ENCOUNTER — HOSPITAL ENCOUNTER (OUTPATIENT)
Facility: HOSPITAL | Age: 38
Discharge: LEFT BEFORE DISCHARGE | End: 2023-03-03
Attending: OBSTETRICS & GYNECOLOGY | Admitting: OBSTETRICS & GYNECOLOGY
Payer: MEDICAID

## 2023-03-03 ENCOUNTER — APPOINTMENT (OUTPATIENT)
Dept: ULTRASOUND IMAGING | Facility: HOSPITAL | Age: 38
End: 2023-03-03
Payer: MEDICAID

## 2023-03-03 VITALS
HEIGHT: 63 IN | RESPIRATION RATE: 16 BRPM | WEIGHT: 210.4 LBS | TEMPERATURE: 98 F | DIASTOLIC BLOOD PRESSURE: 74 MMHG | BODY MASS INDEX: 37.28 KG/M2 | HEART RATE: 82 BPM | SYSTOLIC BLOOD PRESSURE: 128 MMHG | OXYGEN SATURATION: 99 %

## 2023-03-03 DIAGNOSIS — M32.9 SYSTEMIC LUPUS ERYTHEMATOSUS, MATERNAL, ANTEPARTUM: ICD-10-CM

## 2023-03-03 DIAGNOSIS — O99.891 SYSTEMIC LUPUS ERYTHEMATOSUS, MATERNAL, ANTEPARTUM: ICD-10-CM

## 2023-03-03 PROBLEM — O99.333 MATERNAL TOBACCO USE IN THIRD TRIMESTER: Status: ACTIVE | Noted: 2023-03-03

## 2023-03-03 PROBLEM — O48.0 POST TERM PREGNANCY OVER 40 WEEKS: Status: ACTIVE | Noted: 2023-03-03

## 2023-03-03 PROBLEM — F17.213 NICOTINE DEPENDENCE, CIGARETTES, WITH WITHDRAWAL: Status: ACTIVE | Noted: 2023-03-03

## 2023-03-03 PROBLEM — O09.30 HISTORY OF INADEQUATE PRENATAL CARE: Status: ACTIVE | Noted: 2023-03-03

## 2023-03-03 PROBLEM — O99.113 LUPUS ANTICOAGULANT AFFECTING PREGNANCY IN THIRD TRIMESTER, ANTEPARTUM: Status: ACTIVE | Noted: 2023-03-03

## 2023-03-03 PROBLEM — Z82.69 MATERNAL HISTORY OF SYSTEMIC LUPUS ERYTHEMATOSUS (SLE): Status: ACTIVE | Noted: 2023-03-03

## 2023-03-03 PROBLEM — Z53.29 LEFT AGAINST MEDICAL ADVICE: Status: ACTIVE | Noted: 2023-03-03

## 2023-03-03 PROBLEM — D68.62 LUPUS ANTICOAGULANT AFFECTING PREGNANCY IN THIRD TRIMESTER, ANTEPARTUM: Status: ACTIVE | Noted: 2023-03-03

## 2023-03-03 PROBLEM — Z3A.40 40 WEEKS GESTATION OF PREGNANCY: Status: ACTIVE | Noted: 2023-03-03

## 2023-03-03 LAB
ABO GROUP BLD: NORMAL
AMPHET+METHAMPHET UR QL: POSITIVE
AMPHETAMINES UR QL: POSITIVE
BARBITURATES UR QL SCN: NEGATIVE
BENZODIAZ UR QL SCN: NEGATIVE
BLD GP AB SCN SERPL QL: NEGATIVE
BUPRENORPHINE SERPL-MCNC: NEGATIVE NG/ML
CANNABINOIDS SERPL QL: NEGATIVE
COCAINE UR QL: NEGATIVE
DEPRECATED RDW RBC AUTO: 42.5 FL (ref 37–54)
ERYTHROCYTE [DISTWIDTH] IN BLOOD BY AUTOMATED COUNT: 13.6 % (ref 12.3–15.4)
HCT VFR BLD AUTO: 34.1 % (ref 34–46.6)
HGB BLD-MCNC: 11.6 G/DL (ref 12–15.9)
HOLD SPECIMEN: NORMAL
Lab: NORMAL
MCH RBC QN AUTO: 29 PG (ref 26.6–33)
MCHC RBC AUTO-ENTMCNC: 34 G/DL (ref 31.5–35.7)
MCV RBC AUTO: 85.3 FL (ref 79–97)
METHADONE UR QL SCN: NEGATIVE
OPIATES UR QL: NEGATIVE
OXYCODONE UR QL SCN: NEGATIVE
PCP UR QL SCN: NEGATIVE
PLATELET # BLD AUTO: 384 10*3/MM3 (ref 140–450)
PMV BLD AUTO: 8.8 FL (ref 6–12)
PROPOXYPH UR QL: NEGATIVE
RBC # BLD AUTO: 4 10*6/MM3 (ref 3.77–5.28)
RH BLD: POSITIVE
T&S EXPIRATION DATE: NORMAL
TRICYCLICS UR QL SCN: NEGATIVE
WBC NRBC COR # BLD: 8.31 10*3/MM3 (ref 3.4–10.8)

## 2023-03-03 PROCEDURE — 85027 COMPLETE CBC AUTOMATED: CPT | Performed by: OBSTETRICS & GYNECOLOGY

## 2023-03-03 PROCEDURE — 99235 HOSP IP/OBS SAME DATE MOD 70: CPT | Performed by: OBSTETRICS & GYNECOLOGY

## 2023-03-03 PROCEDURE — 86900 BLOOD TYPING SEROLOGIC ABO: CPT | Performed by: OBSTETRICS & GYNECOLOGY

## 2023-03-03 PROCEDURE — 86850 RBC ANTIBODY SCREEN: CPT | Performed by: OBSTETRICS & GYNECOLOGY

## 2023-03-03 PROCEDURE — 76816 OB US FOLLOW-UP PER FETUS: CPT

## 2023-03-03 PROCEDURE — G0480 DRUG TEST DEF 1-7 CLASSES: HCPCS | Performed by: OBSTETRICS & GYNECOLOGY

## 2023-03-03 PROCEDURE — G0463 HOSPITAL OUTPT CLINIC VISIT: HCPCS

## 2023-03-03 PROCEDURE — 80306 DRUG TEST PRSMV INSTRMNT: CPT | Performed by: OBSTETRICS & GYNECOLOGY

## 2023-03-03 PROCEDURE — 86901 BLOOD TYPING SEROLOGIC RH(D): CPT | Performed by: OBSTETRICS & GYNECOLOGY

## 2023-03-03 RX ORDER — LIDOCAINE HYDROCHLORIDE 10 MG/ML
5 INJECTION, SOLUTION EPIDURAL; INFILTRATION; INTRACAUDAL; PERINEURAL AS NEEDED
Status: DISCONTINUED | OUTPATIENT
Start: 2023-03-03 | End: 2023-03-03 | Stop reason: HOSPADM

## 2023-03-03 RX ORDER — BUTORPHANOL TARTRATE 1 MG/ML
1 INJECTION, SOLUTION INTRAMUSCULAR; INTRAVENOUS
Status: DISCONTINUED | OUTPATIENT
Start: 2023-03-03 | End: 2023-03-03 | Stop reason: HOSPADM

## 2023-03-03 RX ORDER — PROMETHAZINE HYDROCHLORIDE 12.5 MG/1
12.5 TABLET ORAL EVERY 6 HOURS PRN
Status: DISCONTINUED | OUTPATIENT
Start: 2023-03-03 | End: 2023-03-03 | Stop reason: HOSPADM

## 2023-03-03 RX ORDER — SODIUM CHLORIDE 0.9 % (FLUSH) 0.9 %
3-10 SYRINGE (ML) INJECTION AS NEEDED
Status: DISCONTINUED | OUTPATIENT
Start: 2023-03-03 | End: 2023-03-03 | Stop reason: HOSPADM

## 2023-03-03 RX ORDER — PROMETHAZINE HYDROCHLORIDE 12.5 MG/1
12.5 SUPPOSITORY RECTAL EVERY 6 HOURS PRN
Status: DISCONTINUED | OUTPATIENT
Start: 2023-03-03 | End: 2023-03-03 | Stop reason: HOSPADM

## 2023-03-03 RX ORDER — BUTORPHANOL TARTRATE 1 MG/ML
2 INJECTION, SOLUTION INTRAMUSCULAR; INTRAVENOUS
Status: DISCONTINUED | OUTPATIENT
Start: 2023-03-03 | End: 2023-03-03 | Stop reason: HOSPADM

## 2023-03-03 RX ORDER — SODIUM CHLORIDE, SODIUM LACTATE, POTASSIUM CHLORIDE, CALCIUM CHLORIDE 600; 310; 30; 20 MG/100ML; MG/100ML; MG/100ML; MG/100ML
125 INJECTION, SOLUTION INTRAVENOUS CONTINUOUS
Status: DISCONTINUED | OUTPATIENT
Start: 2023-03-03 | End: 2023-03-03 | Stop reason: HOSPADM

## 2023-03-03 RX ORDER — SODIUM CHLORIDE 0.9 % (FLUSH) 0.9 %
3 SYRINGE (ML) INJECTION EVERY 12 HOURS SCHEDULED
Status: DISCONTINUED | OUTPATIENT
Start: 2023-03-03 | End: 2023-03-03 | Stop reason: HOSPADM

## 2023-03-03 RX ADMIN — SODIUM CHLORIDE, POTASSIUM CHLORIDE, SODIUM LACTATE AND CALCIUM CHLORIDE 125 ML/HR: 600; 310; 30; 20 INJECTION, SOLUTION INTRAVENOUS at 04:54

## 2023-03-03 NOTE — SIGNIFICANT NOTE
RN called this SW and request we contact CPS due to mother wanting to leave AMA. ODELL Biswas states that Dr. Villa made request for us to contact CPS due to pt's methamphetmine UDS today on admission. Patient was to be induced and they explained that baby would have to go to NICU for OSCAR monitoring and social workers would be involved so now she has decided to leave AMA. SW was also informed and it is also on mother's chart that she does not have custody of her other two children. SUSAN reported to Centralized Intake via web. Web ID [id number].    RAS Rodriges

## 2023-03-03 NOTE — H&P
[facility] [place]  Obstetric History and Physical    Chief complaint: Here for induction for 40-4/7 and lupus      Subjective:   HPI:    Patient is a 37 y.o. female  currently at 40w4d, who presents with chief complaint of here for induction of labor for 40-4/7 and lupus.  Pregnancy is well dated by 13 weeks [place].  Patient became upset when urine drug screen positive.  For amphetamine and methamphetamine although apparently not totally unexpected.  We have long discussion about this further complicating issue was tobacco in nicotine dependency she kept wanting to go smoke.  This was despite having IV in place and being explained to her that this was a tobacco free campus.  We obtained an ultrasound that given estimated fetal weight of 6 pounds 15 ounces.  She said that if she felt this made her not 40-4/7.  I said with early ultrasound I think the pregnancy is well dated in the weight of the baby is inconsistent with her being 40-4/7 and I think all her risk factors places her at very high risk for stillbirth and she needed to stay in plan for delivery.  Despite this she left AMA I    Her prenatal care is spotting prenatal care at [facility].  Past OB history is noted below.      The following portions of the patients history were reviewed and updated as appropriate:   current medications, allergies, past medical history, past surgical history, past family history, past social history and current problem list.     Prenatal Information:  Prenatal Results     POC Urine Glucose/Protein     Test Value Reference Range Date Time    Urine Glucose        Urine Protein              Initial Prenatal Labs     Test Value Reference Range Date Time    Hemoglobin  13.3 g/dL 12.0 - 15.9 22 1314    Hematocrit  39.0 % 34.0 - 46.6 22 1314    Platelets  396 10*3/mm3 140 - 450 22 1314    Rubella IgG  5.02 index Immune >0.99 22 1314    Hepatitis B SAg  Non-Reactive  Non-Reactive  08/03/22 1314    Hepatitis C Ab  Reactive  Non-Reactive 08/03/22 1314    RPR  Non-Reactive  Non-Reactive 08/03/22 1314    T. Pallidum Ab         ABO  A   03/03/23 0454    Rh  Positive   03/03/23 0454    Antibody Screen  Negative   08/03/22 1314    HIV  Non-Reactive  Non-Reactive 08/03/22 1314    Urine Culture  50,000 CFU/mL Normal Urogenital Denia   08/03/22 1314    Gonorrhea  Negative  Negative 08/03/22 1314    Chlamydia  Negative  Negative 08/03/22 1314    TSH        HgB A1c               2nd and 3rd Trimester     Test Value Reference Range Date Time    Hemoglobin (repeated)  11.6 g/dL 12.0 - 15.9 03/03/23 0454       11.9 g/dL 12.0 - 15.9 12/05/22 1019    Hematocrit (repeated)  34.1 % 34.0 - 46.6 03/03/23 0454       33.7 % 34.0 - 46.6 12/05/22 1019    Platelets   384 10*3/mm3 140 - 450 03/03/23 0454       391 10*3/mm3 140 - 450 12/05/22 1019       396 10*3/mm3 140 - 450 08/03/22 1314    GCT  114 mg/dL 65 - 139 12/05/22 1019    Antibody Screen (repeated)  Negative   03/03/23 0454    GTT Fasting        GTT 1 Hr        GTT 2 Hr        GTT 3 Hr        Group B Strep  Negative  Negative 02/21/23 1447          Drug Screening     Test Value Reference Range Date Time    Amphetamine Screen  Positive  Negative 03/03/23 0539       Negative  Negative 08/03/22 1314    Barbiturate Screen  Negative  Negative 03/03/23 0539       Negative  Negative 08/03/22 1314    Benzodiazepine Screen  Negative  Negative 03/03/23 0539       Negative  Negative 08/03/22 1314    Methadone Screen  Negative  Negative 03/03/23 0539       Negative  Negative 08/03/22 1314    Phencyclidine Screen  Negative  Negative 03/03/23 0539       Negative  Negative 08/03/22 1314    Opiates Screen  Negative  Negative 03/03/23 0539       Negative  Negative 08/03/22 1314    THC Screen  Negative  Negative 03/03/23 0539       Negative  Negative 08/03/22 1314    Cocaine Screen  Negative  Negative 03/03/23 0539       Negative  Negative 08/03/22 1314    Propoxyphene Screen   Negative  Negative 03/03/23 0539       Negative  Negative 08/03/22 1314    Buprenorphine Screen  Negative  Negative 03/03/23 0539       Negative  Negative 08/03/22 1314    Methamphetamine Screen  Positive  Negative 03/03/23 0539       Negative  Negative 08/03/22 1314    Oxycodone Screen  Negative  Negative 03/03/23 0539       Negative  Negative 08/03/22 1314    Tricyclic Antidepressants Screen  Negative  Negative 03/03/23 0539       Negative  Negative 08/03/22 1314          Other (Risk screening)     Test Value Reference Range Date Time    Varicella IgG        Parvovirus IgG        CMV IgG        Cystic Fibrosis        Hemoglobin electrophoresis        NIPT        MSAFP-4        AFP (for NTD only)              Legend    ^: Historical                      External Prenatal Results     Pregnancy Outside Results - Transcribed From Office Records - See Scanned Records For Details     Test Value Date Time    ABO  A  03/03/23 0454    Rh  Positive  03/03/23 0454    Antibody Screen  Negative  03/03/23 0454       Negative  08/03/22 1314    Varicella IgG       Rubella  5.02 index 08/03/22 1314    Hgb  11.6 g/dL 03/03/23 0454       11.9 g/dL 12/05/22 1019       13.3 g/dL 08/03/22 1314    Hct  34.1 % 03/03/23 0454       33.7 % 12/05/22 1019       39.0 % 08/03/22 1314    Glucose Fasting GTT       Glucose Tolerance Test 1 hour       Glucose Tolerance Test 3 hour       Gonorrhea (discrete)  Negative  08/03/22 1314    Chlamydia (discrete)  Negative  08/03/22 1314    RPR  Non-Reactive  08/03/22 1314    VDRL       Syphilis Antibody       HBsAg  Non-Reactive  08/03/22 1314    Herpes Simplex Virus PCR       Herpes Simplex VIrus Culture       HIV  Non-Reactive  08/03/22 1314    Hep C RNA Quant PCR       Hep C Antibody  Reactive  08/03/22 1314    AFP       Group B Strep  Negative  02/21/23 1447    GBS Susceptibility to Clindamycin       GBS Susceptibility to Erythromycin       Fetal Fibronectin       Genetic Testing, Maternal Blood              Drug Screening     Test Value Date Time    Urine Drug Screen       Amphetamine Screen  Positive  23 0539       Negative  22 1314    Barbiturate Screen  Negative  23 0539       Negative  22 1314    Benzodiazepine Screen  Negative  23 0539       Negative  22 1314    Methadone Screen  Negative  23 0539       Negative  22 1314    Phencyclidine Screen  Negative  23 0539       Negative  22 1314    Opiates Screen  Negative  23 0539       Negative  22 1314    THC Screen  Negative  23 0539       Negative  22 1314    Cocaine Screen       Propoxyphene Screen  Negative  23 0539       Negative  22 1314    Buprenorphine Screen  Negative  23 0539       Negative  22 1314    Methamphetamine Screen       Oxycodone Screen  Negative  23 0539       Negative  22 1314    Tricyclic Antidepressants Screen  Negative  23 0539       Negative  22 1314          Legend    ^: Historical                         Past OB History:     OB History    Para Term  AB Living   3 2 2 0 0 2   SAB IAB Ectopic Molar Multiple Live Births   0 0 0 0 0 2      # Outcome Date GA Lbr Juan/2nd Weight Sex Delivery Anes PTL Lv   3 Current            2 Term 08/06/10 38w4d  3459 g (7 lb 10 oz) F Vag-Spont EPI  LESLEY      Complications: Smoker   1 Term 09 39w1d  3289 g (7 lb 4 oz) M Vag-Spont EPI  LESLEY      Birth Comments: nuchal cord x 1.  unknown GBS      Complications: Meconium stained amniotic fluid, PROM (premature rupture of membranes)       Past Medical History: Past Medical History:   Diagnosis Date   • Abnormal Pap smear of cervix    • Chronic hepatitis C (HCC)    • Lupus (HCC)    • Substance abuse (HCC)    • Trauma    • Varicella       Past Surgical History Past Surgical History:   Procedure Laterality Date   • WISDOM TOOTH EXTRACTION     • WRIST FRACTURE SURGERY Left       Family History: Family History    Problem Relation Age of Onset   • Diabetes Mother    • Thyroid disease Mother    • No Known Problems Daughter    • No Known Problems Son    • Diabetes Maternal Grandmother    • Heart disease Maternal Grandmother    • Kidney failure Maternal Grandmother    • Cancer Maternal Grandfather    • Colon cancer Neg Hx    • Colon polyps Neg Hx    • Esophageal cancer Neg Hx    • Liver cancer Neg Hx    • Liver disease Neg Hx    • Rectal cancer Neg Hx    • Stomach cancer Neg Hx       Social History:  reports that she has been smoking cigarettes. She has been smoking an average of .5 packs per day. She has never used smokeless tobacco.   reports that she does not currently use alcohol.   reports that she does not currently use drugs after having used the following drugs: Methamphetamines.        General ROS: Pertinent items are noted in HPI    Home Medications:  aspirin and prenatal vitamin    Allergies:  No Known Allergies    Objective       Vital Signs Range for the last 24 hours  Temperature: Temp:  [98 °F (36.7 °C)] 98 °F (36.7 °C)   Temp Source: Temp src: Oral   BP: BP: (128)/(74) 128/74   Pulse: Heart Rate:  [82] 82   Respirations: Resp:  [16] 16   SPO2: SpO2:  [99 %] 99 %     Physical Examination:   General appearance - alert, well appearing, and in no distress  Mental status - alert, oriented to person, place, and time  Chest - clear to auscultation, no wheezes, rales or rhonchi, symmetric air entry  Heart - normal rate, regular rhythm, normal S1, S2, no murmurs, rubs, clicks or gallops  Abdomen - soft, nontender, nondistended, no masses or organomegaly  Pelvic - normal external genitalia, vulva, vagina, cervix, uterus and adnexa  Back exam - full range of motion, no tenderness, palpable spasm or pain on motion  Neurological - alert, oriented, normal speech, no focal findings or movement disorder noted  Extremities - peripheral pulses normal, no pedal edema, no clubbing or cyanosis  Skin - normal coloration and turgor,  no rashes, no suspicious skin lesions noted  Cervix 1-2 very posterior 50% soft -2 I advised oral Cytotec before starting oxytocin    Presentation:  Cephalic by ultrasound   Cervix: Method: sterile exam per physician   Dilation: Cervical Dilation (cm): 1-2   Effacement:     Station:         Fetal Heart Rate Assessment   Method: Fetal HR Assessment Method: external   Beats/min: Fetal HR (beats/min): 130   Baseline: Fetal HR Baseline: normal range   Variability: Fetal HR Variability: moderate (amplitude range 6 to 25 bpm)   Accels: Fetal HR Accelerations: greater than/equal to 15 bpm, lasting at least 15 seconds   Decels: Fetal HR Decelerations: absent   Tracing Category:       Uterine Assessment   Method: Method: external tocotransducer   Frequency (min): Contraction Frequency (Minutes): occ   Ctx Count in 10 min: Contractions in 10 Minutes: 4   Duration:     Intensity: Contraction Intensity: mild by palpation   Pioneer Units:        GBS is  negative 2/21/2023    Assessment & Plan       ASSESSMENT with PLAN:   Active Hospital Problems    Diagnosis  POA   • Maternal history of systemic lupus erythematosus (SLE) [Z82.69]  Not Applicable     3/3/2023 history of lupus primarily joint pain no renal problems     • Post term pregnancy over 40 weeks [O48.0]  Unknown   • 40 weeks gestation of pregnancy [Z3A.40]  Not Applicable   • History of inadequate prenatal care [O09.30]  Not Applicable   • Nicotine dependence, cigarettes, with withdrawal [F17.213]  Unknown   • High-risk pregnancy in second trimester [O09.92]  Not Applicable   • Maternal tobacco use in second trimester [O99.332]  Yes   • Multigravida of advanced maternal age in second trimester [O09.522]  Yes     NIPS: low risk male fetus     • Drug use complicating pregnancy in first trimester [O99.321]  Yes     Reports meth use early pregnancy  Neg UDS at IOB     • Chronic hepatitis C complicating pregnancy, antepartum (HCC) [O98.419, B18.2]  Yes     Has not been  treated; needs referral to GI for tx after delivery     • Chronic hepatitis C without hepatic coma (HCC) [B18.2]  Yes               Plan of care has been reviewed with patient and patient agrees.   Risks, benefits of treatment plan have been discussed.  All questions have been answered.        Electronically signed by Robles Villa MD, 03/03/23, 9:21 AM CST.

## 2023-03-03 NOTE — NURSING NOTE
"Dr. Villa at bedside to discuss POC with patient and significant other. MD explained the difference in initial dating from patients 13 week scan versus ultrasound and EFW today. MD explained suspected IUGR compared to dating. Patient disagreed with this stating \"Sonja never heard that before\". MD explained increase risk of still birth and discussed recommendation for patient to stay and have an induction of labor. Patient refused Cytotec and stated \"I don't want that. I don't believe he is ready\". MD explained to patient that if she did not agree with POC she would have to sign out AMA. Patient stated that she would just \"go to [facility] or come back when Dr. Thomas is here\". Patient proceeded to uncover and spin legs off side of bed, as to get up. RN stated \"we will get your IV out for you and have you sign some paperwork before you leave\". Patient agreeable. IV removed by Natalya BAIN and AMA forms signed with patient and witnessed by Sugar LEVINE.     Prior to discharge, patient made remark of not agreeing with suspected IUGR, that she had \"never been told that before\". Patient reeducated by RN regarding IUGR and that we understand it was not noted at her 13 week ultrasound. However, this is something that can develop over time and that usually becomes an issue in 3rd trimester. Patient states \"if it is, its probably because I smoked\". RN confirmed that smoking is a contributor. Patient then exited unit with significant other.   "

## 2023-03-03 NOTE — DISCHARGE SUMMARY
[facility]  : 1985  MRN: [id number]  CSN: [id number]    Discharge Summary      Date of Admission: 3/3/2023   Date of Discharge: 3/3/2023   Discharge Diagnosis: Active Hospital Problems    Diagnosis  POA   • Maternal history of systemic lupus erythematosus (SLE) [Z82.69]  Not Applicable     3/3/2023 history of lupus primarily joint pain no renal problems     • Post term pregnancy over 40 weeks [O48.0]  Unknown   • 40 weeks gestation of pregnancy [Z3A.40]  Not Applicable   • History of inadequate prenatal care [O09.30]  Not Applicable   • Nicotine dependence, cigarettes, with withdrawal [F17.213]  Unknown   • High-risk pregnancy in second trimester [O09.92]  Not Applicable   • Maternal tobacco use in second trimester [O99.332]  Yes   • Multigravida of advanced maternal age in second trimester [O09.522]  Yes     NIPS: low risk male fetus     • Drug use complicating pregnancy in first trimester [O99.321]  Yes     Reports meth use early pregnancy  Neg UDS at IOB     • Chronic hepatitis C complicating pregnancy, antepartum (HCC) [O98.419, B18.2]  Yes     Has not been treated; needs referral to GI for tx after delivery     • Chronic hepatitis C without hepatic coma (HCC) [B18.2]  Yes             Hospital Course:  Patient was admitted for induction of labor.  She had a history of methamphetamine use in the past but drug screen is has been negative during the pregnancy however drug screen positive on admission.  Patient became quite agitated after this, also exacerbated by nicotine withdrawal.  Her cervix was not particularly favorable being 1-2 posterior -2 recommended Cytotec.  Patient Wanting to go smoke despite having read hospital policy on smoking.  Ultrasound was obtained estimated fetal weight just under 7 pounds patient states she then felt that she was not term I explained to her that with early ultrasound she was in fact term.  Patient left AMA wanting to await onset of labor.  I  explained to her my medical opinion that this was a very bad idea and that I felt it placed her at high risk for stillbirth and other complications      Labs:    Lab Results (last 72 hours)     Procedure Component Value Units Date/Time    Urine Drug Screen - Urine, Clean Catch [[id number]]  (Abnormal) Collected: 03/03/23 0539    Specimen: Urine, Clean Catch Updated: 03/03/23 0615     THC, Screen, Urine Negative     Phencyclidine (PCP), Urine Negative     Cocaine Screen, Urine Negative     Methamphetamine, Ur Positive     Opiate Screen Negative     Amphetamine Screen, Urine Positive     Benzodiazepine Screen, Urine Negative     Tricyclic Antidepressants Screen Negative     Methadone Screen, Urine Negative     Barbiturates Screen, Urine Negative     Oxycodone Screen, Urine Negative     Propoxyphene Screen Negative     Buprenorphine, Screen, Urine Negative    Narrative:      Cutoff For Drugs Screened:    Amphetamines               500 ng/ml  Barbiturates               200 ng/ml  Benzodiazepines            150 ng/ml  Cocaine                    150 ng/ml  Methadone                  200 ng/ml  Opiates                    100 ng/ml  Phencyclidine               25 ng/ml  THC                            50 ng/ml  Methamphetamine            500 ng/ml  Tricyclic Antidepressants  300 ng/ml  Oxycodone                  100 ng/ml  Propoxyphene               300 ng/ml  Buprenorphine               10 ng/ml    The normal value for all drugs tested is negative. This report includes unconfirmed screening results, with the cutoff values listed, to be used for medical treatment purposes only.  Unconfirmed results must not be used for non-medical purposes such as employment or legal testing.  Clinical consideration should be applied to any drug of abuse test, particularly when unconfirmed results are used.      Amphetamines, ToxAssure, UR - Urine, Clean Catch [[id number]] Collected: 03/03/23 0539    Specimen: Urine, Clean Catch Updated:  03/03/23 0615    Extra Tubes [[id number]] Collected: 03/03/23 0454    Specimen: Blood, Venous Line Updated: 03/03/23 0600    Narrative:      The following orders were created for panel order Extra Tubes.  Procedure                               Abnormality         Status                     ---------                               -----------         ------                     Red Top[[id number]]                                          Final result                 Please view results for these tests on the individual orders.    Red Top [[id number]] Collected: 03/03/23 0454    Specimen: Blood Updated: 03/03/23 0600     Extra Tube Hold for add-ons.     Comment: Auto resulted.       CBC (No Diff) [[id number]]  (Abnormal) Collected: 03/03/23 0454    Specimen: Blood Updated: 03/03/23 0503     WBC 8.31 10*3/mm3      RBC 4.00 10*6/mm3      Hemoglobin 11.6 g/dL      Hematocrit 34.1 %      MCV 85.3 fL      MCH 29.0 pg      MCHC 34.0 g/dL      RDW 13.6 %      RDW-SD 42.5 fl      MPV 8.8 fL      Platelets 384 10*3/mm3          Condition at Discharge: Stable   Discharge Diet:  Regular   Discharge Activity: Strict kick counts   Discharge Medications:    Your medication list      ASK your doctor about these medications      Instructions Last Dose Given Next Dose Due   aspirin 81 MG EC tablet      Take 1 tablet by mouth Daily.       prenatal (CLASSIC) vitamin  tablet  Generic drug: prenatal vitamin      Take  by mouth Daily.             Discharge Disposition:  Left [facility] said she was going to go to [facility]   Follow-up:  Return to labor and delivery if she changes her mind.  Will call clinic for follow-up           This note has been electronically signed.    Robles Villa MD  March 3, 2023  09:39 CST

## 2023-03-03 NOTE — H&P (VIEW-ONLY)
[facility]  Obstetric History and Physical    Chief complaint: Here for induction for 40-4/7 and lupus      Subjective:   HPI:    Patient is a 37 y.o. female  currently at 40w4d, who presents with chief complaint of here for induction of labor for 40-4/7 and lupus.  Pregnancy is well dated by 13 weeks [place].  Patient became upset when urine drug screen positive.  For amphetamine and methamphetamine although apparently not totally unexpected.  We have long discussion about this further complicating issue was tobacco in nicotine dependency she kept wanting to go smoke.  This was despite having IV in place and being explained to her that this was a tobacco free campus.  We obtained an ultrasound that given estimated fetal weight of 6 pounds 15 ounces.  She said that if she felt this made her not 40-4/7.  I said with early ultrasound I think the pregnancy is well dated in the weight of the baby is inconsistent with her being 40-4/7 and I think all her risk factors places her at very high risk for stillbirth and she needed to stay in plan for delivery.  Despite this she left AMA I    Her prenatal care is spotting prenatal care at [facility].  Past OB history is noted below.      The following portions of the patients history were reviewed and updated as appropriate:   current medications, allergies, past medical history, past surgical history, past family history, past social history and current problem list.     Prenatal Information:  Prenatal Results     POC Urine Glucose/Protein     Test Value Reference Range Date Time    Urine Glucose        Urine Protein              Initial Prenatal Labs     Test Value Reference Range Date Time    Hemoglobin  13.3 g/dL 12.0 - 15.9 22 1314    Hematocrit  39.0 % 34.0 - 46.6 22 1314    Platelets  396 10*3/mm3 140 - 450 22 1314    Rubella IgG  5.02 index Immune >0.99 22 1314    Hepatitis B SAg  Non-Reactive  Non-Reactive  08/03/22 1314    Hepatitis C Ab  Reactive  Non-Reactive 08/03/22 1314    RPR  Non-Reactive  Non-Reactive 08/03/22 1314    T. Pallidum Ab         ABO  A   03/03/23 0454    Rh  Positive   03/03/23 0454    Antibody Screen  Negative   08/03/22 1314    HIV  Non-Reactive  Non-Reactive 08/03/22 1314    Urine Culture  50,000 CFU/mL Normal Urogenital Denia   08/03/22 1314    Gonorrhea  Negative  Negative 08/03/22 1314    Chlamydia  Negative  Negative 08/03/22 1314    TSH        HgB A1c               2nd and 3rd Trimester     Test Value Reference Range Date Time    Hemoglobin (repeated)  11.6 g/dL 12.0 - 15.9 03/03/23 0454       11.9 g/dL 12.0 - 15.9 12/05/22 1019    Hematocrit (repeated)  34.1 % 34.0 - 46.6 03/03/23 0454       33.7 % 34.0 - 46.6 12/05/22 1019    Platelets   384 10*3/mm3 140 - 450 03/03/23 0454       391 10*3/mm3 140 - 450 12/05/22 1019       396 10*3/mm3 140 - 450 08/03/22 1314    GCT  114 mg/dL 65 - 139 12/05/22 1019    Antibody Screen (repeated)  Negative   03/03/23 0454    GTT Fasting        GTT 1 Hr        GTT 2 Hr        GTT 3 Hr        Group B Strep  Negative  Negative 02/21/23 1447          Drug Screening     Test Value Reference Range Date Time    Amphetamine Screen  Positive  Negative 03/03/23 0539       Negative  Negative 08/03/22 1314    Barbiturate Screen  Negative  Negative 03/03/23 0539       Negative  Negative 08/03/22 1314    Benzodiazepine Screen  Negative  Negative 03/03/23 0539       Negative  Negative 08/03/22 1314    Methadone Screen  Negative  Negative 03/03/23 0539       Negative  Negative 08/03/22 1314    Phencyclidine Screen  Negative  Negative 03/03/23 0539       Negative  Negative 08/03/22 1314    Opiates Screen  Negative  Negative 03/03/23 0539       Negative  Negative 08/03/22 1314    THC Screen  Negative  Negative 03/03/23 0539       Negative  Negative 08/03/22 1314    Cocaine Screen  Negative  Negative 03/03/23 0539       Negative  Negative 08/03/22 1314    Propoxyphene Screen   Negative  Negative 03/03/23 0539       Negative  Negative 08/03/22 1314    Buprenorphine Screen  Negative  Negative 03/03/23 0539       Negative  Negative 08/03/22 1314    Methamphetamine Screen  Positive  Negative 03/03/23 0539       Negative  Negative 08/03/22 1314    Oxycodone Screen  Negative  Negative 03/03/23 0539       Negative  Negative 08/03/22 1314    Tricyclic Antidepressants Screen  Negative  Negative 03/03/23 0539       Negative  Negative 08/03/22 1314          Other (Risk screening)     Test Value Reference Range Date Time    Varicella IgG        Parvovirus IgG        CMV IgG        Cystic Fibrosis        Hemoglobin electrophoresis        NIPT        MSAFP-4        AFP (for NTD only)              Legend    ^: Historical                      External Prenatal Results     Pregnancy Outside Results - Transcribed From Office Records - See Scanned Records For Details     Test Value Date Time    ABO  A  03/03/23 0454    Rh  Positive  03/03/23 0454    Antibody Screen  Negative  03/03/23 0454       Negative  08/03/22 1314    Varicella IgG       Rubella  5.02 index 08/03/22 1314    Hgb  11.6 g/dL 03/03/23 0454       11.9 g/dL 12/05/22 1019       13.3 g/dL 08/03/22 1314    Hct  34.1 % 03/03/23 0454       33.7 % 12/05/22 1019       39.0 % 08/03/22 1314    Glucose Fasting GTT       Glucose Tolerance Test 1 hour       Glucose Tolerance Test 3 hour       Gonorrhea (discrete)  Negative  08/03/22 1314    Chlamydia (discrete)  Negative  08/03/22 1314    RPR  Non-Reactive  08/03/22 1314    VDRL       Syphilis Antibody       HBsAg  Non-Reactive  08/03/22 1314    Herpes Simplex Virus PCR       Herpes Simplex VIrus Culture       HIV  Non-Reactive  08/03/22 1314    Hep C RNA Quant PCR       Hep C Antibody  Reactive  08/03/22 1314    AFP       Group B Strep  Negative  02/21/23 1447    GBS Susceptibility to Clindamycin       GBS Susceptibility to Erythromycin       Fetal Fibronectin       Genetic Testing, Maternal Blood              Drug Screening     Test Value Date Time    Urine Drug Screen       Amphetamine Screen  Positive  23 0539       Negative  22 1314    Barbiturate Screen  Negative  23 0539       Negative  22 1314    Benzodiazepine Screen  Negative  23 0539       Negative  22 1314    Methadone Screen  Negative  23 0539       Negative  22 1314    Phencyclidine Screen  Negative  23 0539       Negative  22 1314    Opiates Screen  Negative  23 0539       Negative  22 1314    THC Screen  Negative  23 0539       Negative  22 1314    Cocaine Screen       Propoxyphene Screen  Negative  23 0539       Negative  22 1314    Buprenorphine Screen  Negative  23 0539       Negative  22 1314    Methamphetamine Screen       Oxycodone Screen  Negative  23 0539       Negative  22 1314    Tricyclic Antidepressants Screen  Negative  23 0539       Negative  22 1314          Legend    ^: Historical                         Past OB History:     OB History    Para Term  AB Living   3 2 2 0 0 2   SAB IAB Ectopic Molar Multiple Live Births   0 0 0 0 0 2      # Outcome Date GA Lbr Juan/2nd Weight Sex Delivery Anes PTL Lv   3 Current            2 Term 08/06/10 38w4d  3459 g (7 lb 10 oz) F Vag-Spont EPI  LESLEY      Complications: Smoker   1 Term 09 39w1d  3289 g (7 lb 4 oz) M Vag-Spont EPI  LESLEY      Birth Comments: nuchal cord x 1.  unknown GBS      Complications: Meconium stained amniotic fluid, PROM (premature rupture of membranes)       Past Medical History: Past Medical History:   Diagnosis Date   • Abnormal Pap smear of cervix    • Chronic hepatitis C (HCC)    • Lupus (HCC)    • Substance abuse (HCC)    • Trauma    • Varicella       Past Surgical History Past Surgical History:   Procedure Laterality Date   • WISDOM TOOTH EXTRACTION     • WRIST FRACTURE SURGERY Left       Family History: Family History    Problem Relation Age of Onset   • Diabetes Mother    • Thyroid disease Mother    • No Known Problems Daughter    • No Known Problems Son    • Diabetes Maternal Grandmother    • Heart disease Maternal Grandmother    • Kidney failure Maternal Grandmother    • Cancer Maternal Grandfather    • Colon cancer Neg Hx    • Colon polyps Neg Hx    • Esophageal cancer Neg Hx    • Liver cancer Neg Hx    • Liver disease Neg Hx    • Rectal cancer Neg Hx    • Stomach cancer Neg Hx       Social History:  reports that she has been smoking cigarettes. She has been smoking an average of .5 packs per day. She has never used smokeless tobacco.   reports that she does not currently use alcohol.   reports that she does not currently use drugs after having used the following drugs: Methamphetamines.        General ROS: Pertinent items are noted in HPI    Home Medications:  aspirin and prenatal vitamin    Allergies:  No Known Allergies    Objective       Vital Signs Range for the last 24 hours  Temperature: Temp:  [98 °F (36.7 °C)] 98 °F (36.7 °C)   Temp Source: Temp src: Oral   BP: BP: (128)/(74) 128/74   Pulse: Heart Rate:  [82] 82   Respirations: Resp:  [16] 16   SPO2: SpO2:  [99 %] 99 %     Physical Examination:   General appearance - alert, well appearing, and in no distress  Mental status - alert, oriented to person, place, and time  Chest - clear to auscultation, no wheezes, rales or rhonchi, symmetric air entry  Heart - normal rate, regular rhythm, normal S1, S2, no murmurs, rubs, clicks or gallops  Abdomen - soft, nontender, nondistended, no masses or organomegaly  Pelvic - normal external genitalia, vulva, vagina, cervix, uterus and adnexa  Back exam - full range of motion, no tenderness, palpable spasm or pain on motion  Neurological - alert, oriented, normal speech, no focal findings or movement disorder noted  Extremities - peripheral pulses normal, no pedal edema, no clubbing or cyanosis  Skin - normal coloration and turgor,  no rashes, no suspicious skin lesions noted  Cervix 1-2 very posterior 50% soft -2 I advised oral Cytotec before starting oxytocin    Presentation:  Cephalic by ultrasound   Cervix: Method: sterile exam per physician   Dilation: Cervical Dilation (cm): 1-2   Effacement:     Station:         Fetal Heart Rate Assessment   Method: Fetal HR Assessment Method: external   Beats/min: Fetal HR (beats/min): 130   Baseline: Fetal HR Baseline: normal range   Variability: Fetal HR Variability: moderate (amplitude range 6 to 25 bpm)   Accels: Fetal HR Accelerations: greater than/equal to 15 bpm, lasting at least 15 seconds   Decels: Fetal HR Decelerations: absent   Tracing Category:       Uterine Assessment   Method: Method: external tocotransducer   Frequency (min): Contraction Frequency (Minutes): occ   Ctx Count in 10 min: Contractions in 10 Minutes: 4   Duration:     Intensity: Contraction Intensity: mild by palpation   [place] Units:        GBS is  negative 2/21/2023    Assessment & Plan       ASSESSMENT with PLAN:   Active Hospital Problems    Diagnosis  POA   • Maternal history of systemic lupus erythematosus (SLE) [Z82.69]  Not Applicable     3/3/2023 history of lupus primarily joint pain no renal problems     • Post term pregnancy over 40 weeks [O48.0]  Unknown   • 40 weeks gestation of pregnancy [Z3A.40]  Not Applicable   • History of inadequate prenatal care [O09.30]  Not Applicable   • Nicotine dependence, cigarettes, with withdrawal [F17.213]  Unknown   • High-risk pregnancy in second trimester [O09.92]  Not Applicable   • Maternal tobacco use in second trimester [O99.332]  Yes   • Multigravida of advanced maternal age in second trimester [O09.522]  Yes     NIPS: low risk male fetus     • Drug use complicating pregnancy in first trimester [O99.321]  Yes     Reports meth use early pregnancy  Neg UDS at IOB     • Chronic hepatitis C complicating pregnancy, antepartum (HCC) [O98.419, B18.2]  Yes     Has not been  treated; needs referral to GI for tx after delivery     • Chronic hepatitis C without hepatic coma (HCC) [B18.2]  Yes               Plan of care has been reviewed with patient and patient agrees.   Risks, benefits of treatment plan have been discussed.  All questions have been answered.        Electronically signed by Robles Villa MD, 03/03/23, 9:21 AM CST.

## 2023-03-03 NOTE — PAYOR COMM NOTE
"  [facility]  Case Managment Extender   [place]  P) [phone] (F) [phone]        REF# [id number]        Latisha Turner (37 y.o. Female)     Date of Birth   1985    Social Security Number       Address   [address] [id number]    Home Phone   [phone]    MRN   [id number]       Holiness   [employer]    Marital Status   Significant Other                            Admission Date   3/3/23    Admission Type   Elective    Admitting Provider   Robles Villa MD    Attending Provider       Department, Room/Bed   [facility] LABOR DELIVERY, L771/       Discharge Date   3/3/2023    Discharge Disposition   Left Against Medical Advice    Discharge Destination                               Attending Provider: (none)   Allergies: No Known Allergies    Isolation: None   Infection: None   Code Status: Prior    Ht: 160 cm (63\")   Wt: 95.4 kg (210 lb 6.4 oz)    Admission Cmt: None   Principal Problem: None                Active Insurance as of 3/3/2023     Primary Coverage     Payor Plan Insurance Group Employer/Plan Group    ANTHEM MEDICAID ANTH MEDICAID [id number]     Payor Plan Address Payor Plan Phone Number Payor Plan Fax Number Effective Dates    [address] [address] [phone]  2019 - None Entered    [facility] [id number]       Subscriber Name Subscriber Birth Date Member ID       LATISHA TURNER 1985 [id number]                 Emergency Contacts      (Rel.) Home Phone Work Phone Mobile Phone    Landry Lora (Partner) -- -- [phone]               Discharge Summary      Robles Villa MD at 23 0840          [facility]  Latisha Turner  : 1985  MRN: [id number]  CSN: [id number]    Discharge Summary      Date of Admission: 3/3/2023   Date of Discharge: 3/3/2023   Discharge Diagnosis: Active Hospital Problems    Diagnosis  POA   • Maternal history of systemic lupus " erythematosus (SLE) [Z82.69]  Not Applicable     3/3/2023 history of lupus primarily joint pain no renal problems     • Post term pregnancy over 40 weeks [O48.0]  Unknown   • 40 weeks gestation of pregnancy [Z3A.40]  Not Applicable   • History of inadequate prenatal care [O09.30]  Not Applicable   • Nicotine dependence, cigarettes, with withdrawal [F17.213]  Unknown   • High-risk pregnancy in second trimester [O09.92]  Not Applicable   • Maternal tobacco use in second trimester [O99.332]  Yes   • Multigravida of advanced maternal age in second trimester [O09.522]  Yes     NIPS: low risk male fetus     • Drug use complicating pregnancy in first trimester [O99.321]  Yes     Reports meth use early pregnancy  Neg UDS at IOB     • Chronic hepatitis C complicating pregnancy, antepartum (HCC) [O98.419, B18.2]  Yes     Has not been treated; needs referral to GI for tx after delivery     • Chronic hepatitis C without hepatic coma (HCC) [B18.2]  Yes             Hospital Course:  Patient was admitted for induction of labor.  She had a history of methamphetamine use in the past but drug screen is has been negative during the pregnancy however drug screen positive on admission.  Patient became quite agitated after this, also exacerbated by nicotine withdrawal.  Her cervix was not particularly favorable being 1-2 posterior -2 recommended Cytotec.  Patient Wanting to go smoke despite having read hospital policy on smoking.  Ultrasound was obtained estimated fetal weight just under 7 pounds patient states she then felt that she was not term I explained to her that with early ultrasound she was in fact term.  Patient left AMA wanting to await onset of labor.  I explained to her my medical opinion that this was a very bad idea and that I felt it placed her at high risk for stillbirth and other complications      Labs:    Lab Results (last 72 hours)     Procedure Component Value Units Date/Time    Urine Drug Screen - Urine, Clean  Catch [[id number]]  (Abnormal) Collected: 03/03/23 0539    Specimen: Urine, Clean Catch Updated: 03/03/23 0615     THC, Screen, Urine Negative     Phencyclidine (PCP), Urine Negative     Cocaine Screen, Urine Negative     Methamphetamine, Ur Positive     Opiate Screen Negative     Amphetamine Screen, Urine Positive     Benzodiazepine Screen, Urine Negative     Tricyclic Antidepressants Screen Negative     Methadone Screen, Urine Negative     Barbiturates Screen, Urine Negative     Oxycodone Screen, Urine Negative     Propoxyphene Screen Negative     Buprenorphine, Screen, Urine Negative    Narrative:      Cutoff For Drugs Screened:    Amphetamines               500 ng/ml  Barbiturates               200 ng/ml  Benzodiazepines            150 ng/ml  Cocaine                    150 ng/ml  Methadone                  200 ng/ml  Opiates                    100 ng/ml  Phencyclidine               25 ng/ml  THC                            50 ng/ml  Methamphetamine            500 ng/ml  Tricyclic Antidepressants  300 ng/ml  Oxycodone                  100 ng/ml  Propoxyphene               300 ng/ml  Buprenorphine               10 ng/ml    The normal value for all drugs tested is negative. This report includes unconfirmed screening results, with the cutoff values listed, to be used for medical treatment purposes only.  Unconfirmed results must not be used for non-medical purposes such as employment or legal testing.  Clinical consideration should be applied to any drug of abuse test, particularly when unconfirmed results are used.      Amphetamines, ToxAssure, UR - Urine, Clean Catch [[id number]] Collected: 03/03/23 0539    Specimen: Urine, Clean Catch Updated: 03/03/23 0615    Extra Tubes [[id number]] Collected: 03/03/23 0454    Specimen: Blood, Venous Line Updated: 03/03/23 0600    Narrative:      The following orders were created for panel order Extra Tubes.  Procedure                               Abnormality         Status                      ---------                               -----------         ------                     Red Top[[id number]]                                          Final result                 Please view results for these tests on the individual orders.    Red Top [[id number]] Collected: 03/03/23 0454    Specimen: Blood Updated: 03/03/23 0600     Extra Tube Hold for add-ons.     Comment: Auto resulted.       CBC (No Diff) [[id number]]  (Abnormal) Collected: 03/03/23 0454    Specimen: Blood Updated: 03/03/23 0503     WBC 8.31 10*3/mm3      RBC 4.00 10*6/mm3      Hemoglobin 11.6 g/dL      Hematocrit 34.1 %      MCV 85.3 fL      MCH 29.0 pg      MCHC 34.0 g/dL      RDW 13.6 %      RDW-SD 42.5 fl      MPV 8.8 fL      Platelets 384 10*3/mm3          Condition at Discharge: Stable   Discharge Diet:  Regular   Discharge Activity: Strict kick counts   Discharge Medications:    Your medication list      ASK your doctor about these medications      Instructions Last Dose Given Next Dose Due   aspirin 81 MG EC tablet      Take 1 tablet by mouth Daily.       prenatal (CLASSIC) vitamin  tablet  Generic drug: prenatal vitamin      Take  by mouth Daily.             Discharge Disposition:  Left CARM[facility]O said she was going to go to [facility]   Follow-up:  Return to labor and delivery if she changes her mind.  Will call clinic for follow-up           This note has been electronically signed.    Robles Villa MD  March 3, 2023  09:39 CST      Electronically signed by Robles Villa MD at 03/03/23 0949

## 2023-03-03 NOTE — PAYOR COMM NOTE
"  [facility]  Case Managment Extender   [place]  (P) [phone]  (F) [phone]        REF# [id number]  Latisha Turner (37 y.o. Female)     Date of Birth   1985    Social Security Number       Address   [address] [address]    Home Phone   [phone]    MRN   [id number]       Orthodoxy   Yazidi    Marital Status   Significant Other                            Admission Date   3/3/23    Admission Type   Elective    Admitting Provider   Robles Villa MD    Attending Provider       Department, Room/Bed   [facility] LABOR DELIVERY, L771/       Discharge Date   3/3/2023    Discharge Disposition   Left Against Medical Advice    Discharge Destination                               Attending Provider: (none)   Allergies: No Known Allergies    Isolation: None   Infection: None   Code Status: Prior    Ht: 160 cm (63\")   Wt: 95.4 kg (210 lb 6.4 oz)    Admission Cmt: None   Principal Problem: None                Active Insurance as of 3/3/2023     Primary Coverage     Payor Plan Insurance Group Employer/Plan Group    ANTHEM MEDICAID ANTHEM MEDICAID [id number]     Payor Plan Address Payor Plan Phone Number Payor Plan Fax Number Effective Dates    [address] [address] [phone]  2019 - None Entered    [facility] [address]       Subscriber Name Subscriber Birth Date Member ID       LATISHA TURNER 1985 [id number]                 Emergency Contacts      (Rel.) Home Phone Work Phone Mobile Phone    Landry Lora (Partner) -- -- [phone]               History & Physical      Robles Villa MD at 23 0840          [facility]  Obstetric History and Physical    Chief complaint: Here for induction for 40-4/7 and lupus      Subjective:   HPI:    Patient is a 37 y.o. female  currently at 40w4d, who presents with chief complaint of here for induction of labor for 40-4/7 and lupus.  " Pregnancy is well dated by 13 weeks Feehan.  Patient became upset when urine drug screen positive.  For amphetamine and methamphetamine although apparently not totally unexpected.  We have long discussion about this further complicating issue was tobacco in nicotine dependency she kept wanting to go smoke.  This was despite having IV in place and being explained to her that this was a tobacco free campus.  We obtained an ultrasound that given estimated fetal weight of 6 pounds 15 ounces.  She said that if she felt this made her not 40-4/7.  I said with early ultrasound I think the pregnancy is well dated in the weight of the baby is inconsistent with her being 40-4/7 and I think all her risk factors places her at very high risk for stillbirth and she needed to stay in plan for delivery.  Despite this she left AMA I    Her prenatal care is spotting prenatal care at [facility].  Past OB history is noted below.      The following portions of the patients history were reviewed and updated as appropriate:   current medications, allergies, past medical history, past surgical history, past family history, past social history and current problem list.     Prenatal Information:  Prenatal Results     POC Urine Glucose/Protein     Test Value Reference Range Date Time    Urine Glucose        Urine Protein              Initial Prenatal Labs     Test Value Reference Range Date Time    Hemoglobin  13.3 g/dL 12.0 - 15.9 08/03/22 1314    Hematocrit  39.0 % 34.0 - 46.6 08/03/22 1314    Platelets  396 10*3/mm3 140 - 450 08/03/22 1314    Rubella IgG  5.02 index Immune >0.99 08/03/22 1314    Hepatitis B SAg  Non-Reactive  Non-Reactive 08/03/22 1314    Hepatitis C Ab  Reactive  Non-Reactive 08/03/22 1314    RPR  Non-Reactive  Non-Reactive 08/03/22 1314    T. Pallidum Ab         ABO  A   03/03/23 0454    Rh  Positive   03/03/23 0454    Antibody Screen  Negative   08/03/22 1314    HIV  Non-Reactive  Non-Reactive  08/03/22 1314    Urine Culture  50,000 CFU/mL Normal Urogenital Denia   08/03/22 1314    Gonorrhea  Negative  Negative 08/03/22 1314    Chlamydia  Negative  Negative 08/03/22 1314    TSH        HgB A1c               2nd and 3rd Trimester     Test Value Reference Range Date Time    Hemoglobin (repeated)  11.6 g/dL 12.0 - 15.9 03/03/23 0454       11.9 g/dL 12.0 - 15.9 12/05/22 1019    Hematocrit (repeated)  34.1 % 34.0 - 46.6 03/03/23 0454       33.7 % 34.0 - 46.6 12/05/22 1019    Platelets   384 10*3/mm3 140 - 450 03/03/23 0454       391 10*3/mm3 140 - 450 12/05/22 1019       396 10*3/mm3 140 - 450 08/03/22 1314    GCT  114 mg/dL 65 - 139 12/05/22 1019    Antibody Screen (repeated)  Negative   03/03/23 0454    GTT Fasting        GTT 1 Hr        GTT 2 Hr        GTT 3 Hr        Group B Strep  Negative  Negative 02/21/23 1447          Drug Screening     Test Value Reference Range Date Time    Amphetamine Screen  Positive  Negative 03/03/23 0539       Negative  Negative 08/03/22 1314    Barbiturate Screen  Negative  Negative 03/03/23 0539       Negative  Negative 08/03/22 1314    Benzodiazepine Screen  Negative  Negative 03/03/23 0539       Negative  Negative 08/03/22 1314    Methadone Screen  Negative  Negative 03/03/23 0539       Negative  Negative 08/03/22 1314    Phencyclidine Screen  Negative  Negative 03/03/23 0539       Negative  Negative 08/03/22 1314    Opiates Screen  Negative  Negative 03/03/23 0539       Negative  Negative 08/03/22 1314    THC Screen  Negative  Negative 03/03/23 0539       Negative  Negative 08/03/22 1314    Cocaine Screen  Negative  Negative 03/03/23 0539       Negative  Negative 08/03/22 1314    Propoxyphene Screen  Negative  Negative 03/03/23 0539       Negative  Negative 08/03/22 1314    Buprenorphine Screen  Negative  Negative 03/03/23 0539       Negative  Negative 08/03/22 1314    Methamphetamine Screen  Positive  Negative 03/03/23 0539       Negative  Negative 08/03/22 1314     Oxycodone Screen  Negative  Negative 03/03/23 0539       Negative  Negative 08/03/22 1314    Tricyclic Antidepressants Screen  Negative  Negative 03/03/23 0539       Negative  Negative 08/03/22 1314          Other (Risk screening)     Test Value Reference Range Date Time    Varicella IgG        Parvovirus IgG        CMV IgG        Cystic Fibrosis        Hemoglobin electrophoresis        NIPT        MSAFP-4        AFP (for NTD only)              Legend    ^: Historical                      External Prenatal Results     Pregnancy Outside Results - Transcribed From Office Records - See Scanned Records For Details     Test Value Date Time    ABO  A  03/03/23 0454    Rh  Positive  03/03/23 0454    Antibody Screen  Negative  03/03/23 0454       Negative  08/03/22 1314    Varicella IgG       Rubella  5.02 index 08/03/22 1314    Hgb  11.6 g/dL 03/03/23 0454       11.9 g/dL 12/05/22 1019       13.3 g/dL 08/03/22 1314    Hct  34.1 % 03/03/23 0454       33.7 % 12/05/22 1019       39.0 % 08/03/22 1314    Glucose Fasting GTT       Glucose Tolerance Test 1 hour       Glucose Tolerance Test 3 hour       Gonorrhea (discrete)  Negative  08/03/22 1314    Chlamydia (discrete)  Negative  08/03/22 1314    RPR  Non-Reactive  08/03/22 1314    VDRL       Syphilis Antibody       HBsAg  Non-Reactive  08/03/22 1314    Herpes Simplex Virus PCR       Herpes Simplex VIrus Culture       HIV  Non-Reactive  08/03/22 1314    Hep C RNA Quant PCR       Hep C Antibody  Reactive  08/03/22 1314    AFP       Group B Strep  Negative  02/21/23 1447    GBS Susceptibility to Clindamycin       GBS Susceptibility to Erythromycin       Fetal Fibronectin       Genetic Testing, Maternal Blood             Drug Screening     Test Value Date Time    Urine Drug Screen       Amphetamine Screen  Positive  03/03/23 0539       Negative  08/03/22 1314    Barbiturate Screen  Negative  03/03/23 0539       Negative  08/03/22 1314    Benzodiazepine Screen  Negative  03/03/23  0539       Negative  22 1314    Methadone Screen  Negative  23 0539       Negative  22 1314    Phencyclidine Screen  Negative  23 0539       Negative  22 1314    Opiates Screen  Negative  23 0539       Negative  22 1314    THC Screen  Negative  23 0539       Negative  22 1314    Cocaine Screen       Propoxyphene Screen  Negative  23 0539       Negative  22 1314    Buprenorphine Screen  Negative  23 0539       Negative  22 1314    Methamphetamine Screen       Oxycodone Screen  Negative  23 0539       Negative  22 1314    Tricyclic Antidepressants Screen  Negative  23 0539       Negative  22 1314          Legend    ^: Historical                         Past OB History:     OB History    Para Term  AB Living   3 2 2 0 0 2   SAB IAB Ectopic Molar Multiple Live Births   0 0 0 0 0 2      # Outcome Date GA Lbr Juan/2nd Weight Sex Delivery Anes PTL Lv   3 Current            2 Term 08/06/10 38w4d  3459 g (7 lb 10 oz) F Vag-Spont EPI  LESLEY      Complications: Smoker   1 Term 09 39w1d  3289 g (7 lb 4 oz) M Vag-Spont EPI  LESLEY      Birth Comments: nuchal cord x 1.  unknown GBS      Complications: Meconium stained amniotic fluid, PROM (premature rupture of membranes)       Past Medical History: Past Medical History:   Diagnosis Date   • Abnormal Pap smear of cervix    • Chronic hepatitis C (HCC)    • Lupus (HCC)    • Substance abuse (HCC)    • Trauma    • Varicella       Past Surgical History Past Surgical History:   Procedure Laterality Date   • WISDOM TOOTH EXTRACTION     • WRIST FRACTURE SURGERY Left       Family History: Family History   Problem Relation Age of Onset   • Diabetes Mother    • Thyroid disease Mother    • No Known Problems Daughter    • No Known Problems Son    • Diabetes Maternal Grandmother    • Heart disease Maternal Grandmother    • Kidney failure Maternal Grandmother    • Cancer Maternal  Grandfather    • Colon cancer Neg Hx    • Colon polyps Neg Hx    • Esophageal cancer Neg Hx    • Liver cancer Neg Hx    • Liver disease Neg Hx    • Rectal cancer Neg Hx    • Stomach cancer Neg Hx       Social History:  reports that she has been smoking cigarettes. She has been smoking an average of .5 packs per day. She has never used smokeless tobacco.   reports that she does not currently use alcohol.   reports that she does not currently use drugs after having used the following drugs: Methamphetamines.        General ROS: Pertinent items are noted in HPI    Home Medications:  aspirin and prenatal vitamin    Allergies:  No Known Allergies    Objective        Vital Signs Range for the last 24 hours  Temperature: Temp:  [98 °F (36.7 °C)] 98 °F (36.7 °C)   Temp Source: Temp src: Oral   BP: BP: (128)/(74) 128/74   Pulse: Heart Rate:  [82] 82   Respirations: Resp:  [16] 16   SPO2: SpO2:  [99 %] 99 %     Physical Examination:   General appearance - alert, well appearing, and in no distress  Mental status - alert, oriented to person, place, and time  Chest - clear to auscultation, no wheezes, rales or rhonchi, symmetric air entry  Heart - normal rate, regular rhythm, normal S1, S2, no murmurs, rubs, clicks or gallops  Abdomen - soft, nontender, nondistended, no masses or organomegaly  Pelvic - normal external genitalia, vulva, vagina, cervix, uterus and adnexa  Back exam - full range of motion, no tenderness, palpable spasm or pain on motion  Neurological - alert, oriented, normal speech, no focal findings or movement disorder noted  Extremities - peripheral pulses normal, no pedal edema, no clubbing or cyanosis  Skin - normal coloration and turgor, no rashes, no suspicious skin lesions noted  Cervix 1-2 very posterior 50% soft -2 I advised oral Cytotec before starting oxytocin    Presentation:  Cephalic by ultrasound   Cervix: Method: sterile exam per physician   Dilation: Cervical Dilation (cm): 1-2   Effacement:      Station:         Fetal Heart Rate Assessment   Method: Fetal HR Assessment Method: external   Beats/min: Fetal HR (beats/min): 130   Baseline: Fetal HR Baseline: normal range   Variability: Fetal HR Variability: moderate (amplitude range 6 to 25 bpm)   Accels: Fetal HR Accelerations: greater than/equal to 15 bpm, lasting at least 15 seconds   Decels: Fetal HR Decelerations: absent   Tracing Category:       Uterine Assessment   Method: Method: external tocotransducer   Frequency (min): Contraction Frequency (Minutes): occ   Ctx Count in 10 min: Contractions in 10 Minutes: 4   Duration:     Intensity: Contraction Intensity: mild by palpation   Harmony Units:        GBS is  negative 2/21/2023    Assessment & Plan       ASSESSMENT with PLAN:   Active Hospital Problems    Diagnosis  POA   • Maternal history of systemic lupus erythematosus (SLE) [Z82.69]  Not Applicable     3/3/2023 history of lupus primarily joint pain no renal problems     • Post term pregnancy over 40 weeks [O48.0]  Unknown   • 40 weeks gestation of pregnancy [Z3A.40]  Not Applicable   • History of inadequate prenatal care [O09.30]  Not Applicable   • Nicotine dependence, cigarettes, with withdrawal [F17.213]  Unknown   • High-risk pregnancy in second trimester [O09.92]  Not Applicable   • Maternal tobacco use in second trimester [O99.332]  Yes   • Multigravida of advanced maternal age in second trimester [O09.522]  Yes     NIPS: low risk male fetus     • Drug use complicating pregnancy in first trimester [O99.321]  Yes     Reports meth use early pregnancy  Neg UDS at IOB     • Chronic hepatitis C complicating pregnancy, antepartum (HCC) [O98.419, B18.2]  Yes     Has not been treated; needs referral to GI for tx after delivery     • Chronic hepatitis C without hepatic coma (HCC) [B18.2]  Yes               Plan of care has been reviewed with patient and patient agrees.   Risks, benefits of treatment plan have been discussed.  All questions have  been answered.        Electronically signed by Robles Villa MD, 03/03/23, 9:21 AM CST.      Electronically signed by Robles Villa MD at 03/03/23 0926         Lab Results (last 24 hours)     Procedure Component Value Units Date/Time    Urine Drug Screen - Urine, Clean Catch [[id number]]  (Abnormal) Collected: 03/03/23 0539    Specimen: Urine, Clean Catch Updated: 03/03/23 0615     THC, Screen, Urine Negative     Phencyclidine (PCP), Urine Negative     Cocaine Screen, Urine Negative     Methamphetamine, Ur Positive     Opiate Screen Negative     Amphetamine Screen, Urine Positive     Benzodiazepine Screen, Urine Negative     Tricyclic Antidepressants Screen Negative     Methadone Screen, Urine Negative     Barbiturates Screen, Urine Negative     Oxycodone Screen, Urine Negative     Propoxyphene Screen Negative     Buprenorphine, Screen, Urine Negative    Narrative:      Cutoff For Drugs Screened:    Amphetamines               500 ng/ml  Barbiturates               200 ng/ml  Benzodiazepines            150 ng/ml  Cocaine                    150 ng/ml  Methadone                  200 ng/ml  Opiates                    100 ng/ml  Phencyclidine               25 ng/ml  THC                            50 ng/ml  Methamphetamine            500 ng/ml  Tricyclic Antidepressants  300 ng/ml  Oxycodone                  100 ng/ml  Propoxyphene               300 ng/ml  Buprenorphine               10 ng/ml    The normal value for all drugs tested is negative. This report includes unconfirmed screening results, with the cutoff values listed, to be used for medical treatment purposes only.  Unconfirmed results must not be used for non-medical purposes such as employment or legal testing.  Clinical consideration should be applied to any drug of abuse test, particularly when unconfirmed results are used.      Amphetamines, ToxAssure, UR - Urine, Clean Catch [[id number]] Collected: 03/03/23 0539    Specimen: Urine, Clean Catch Updated:  03/03/23 0615    Extra Tubes [[id number]] Collected: 03/03/23 0454    Specimen: Blood, Venous Line Updated: 03/03/23 0600    Narrative:      The following orders were created for panel order Extra Tubes.  Procedure                               Abnormality         Status                     ---------                               -----------         ------                     Red Top[[id number]]                                          Final result                 Please view results for these tests on the individual orders.    Red Top [[id number]] Collected: 03/03/23 0454    Specimen: Blood Updated: 03/03/23 0600     Extra Tube Hold for add-ons.     Comment: Auto resulted.       CBC (No Diff) [[id number]]  (Abnormal) Collected: 03/03/23 0454    Specimen: Blood Updated: 03/03/23 0503     WBC 8.31 10*3/mm3      RBC 4.00 10*6/mm3      Hemoglobin 11.6 g/dL      Hematocrit 34.1 %      MCV 85.3 fL      MCH 29.0 pg      MCHC 34.0 g/dL      RDW 13.6 %      RDW-SD 42.5 fl      MPV 8.8 fL      Platelets 384 10*3/mm3         Imaging Results (Last 24 Hours)     Procedure Component Value Units Date/Time    US Ob Follow Up Transabdominal Approach [[id number]] Collected: 03/03/23 0717     Updated: 03/03/23 0825    Narrative:      OB ULTRASOUND - LIMITED  CLINICAL INDICATION: Estimated fetal weight    COMPARISON: Prior OB ultrasound 10/12/2022    FINDINGS:     Gestation: Intrauterine, cephalic presentation  Placenta: Posterior without evidence of previa    Amniotic fluid Index: 13.16    Fetal heart rate: Normal, 150.5 bpm, within normal limits      Fetal Biometry:      BPD: 8.8 cm = 35 weeks 3 days gestational age   HC:  32.8 cm = 37 weeks 2 days gestational age   AC:  33.4 cm = 37 weeks 2 days gestational age   FL:  7.4 cm = 37 weeks 6 days gestational age     EFW: 3150 grams     Average gestational age by ultrasound: 37 weeks 0 days   Estimated date of delivery: 3/24/2023        Impression:      CONCLUSION:      Single, currently  viable intrauterine gestation as above    Electronically signed by:  Koby Burrell DO  3/3/2023 8:22 AM CST  Workstation: [id number]        ECG/EMG Results (last 24 hours)     ** No results found for the last 24 hours. **        Physician Progress Notes (last 24 hours)  Notes from 03/02/23 1210 through 03/03/23 1210   No notes of this type exist for this encounter.         Medical Student Notes (last 24 hours)  Notes from 03/02/23 1210 through 03/03/23 1210   No notes of this type exist for this encounter.         Consult Notes (last 24 hours)  Notes from 03/02/23 1210 through 03/03/23 1210   No notes of this type exist for this encounter.

## 2023-03-03 NOTE — SIGNIFICANT NOTE
"SW received email from Centralized Intake that states: \"At this time, the report you submitted DOES NOT meet acceptance criteria for further assessment and will NOT be assigned to a /staff.\"    RAS Rodriges  "

## 2023-03-06 ENCOUNTER — HOSPITAL ENCOUNTER (INPATIENT)
Facility: HOSPITAL | Age: 38
LOS: 2 days | Discharge: HOME | End: 2023-03-08
Attending: STUDENT IN AN ORGANIZED HEALTH CARE EDUCATION/TRAINING PROGRAM | Admitting: STUDENT IN AN ORGANIZED HEALTH CARE EDUCATION/TRAINING PROGRAM
Payer: MEDICAID

## 2023-03-06 ENCOUNTER — TELEPHONE (OUTPATIENT)
Dept: OBSTETRICS AND GYNECOLOGY | Facility: CLINIC | Age: 38
End: 2023-03-06

## 2023-03-06 DIAGNOSIS — O99.891 SYSTEMIC LUPUS ERYTHEMATOSUS, MATERNAL, ANTEPARTUM: ICD-10-CM

## 2023-03-06 DIAGNOSIS — M32.9 SYSTEMIC LUPUS ERYTHEMATOSUS, MATERNAL, ANTEPARTUM: ICD-10-CM

## 2023-03-06 PROBLEM — Z34.90 ENCOUNTER FOR INDUCTION OF LABOR: Status: ACTIVE | Noted: 2023-03-06

## 2023-03-06 LAB
ABO GROUP BLD: NORMAL
AMPHET+METHAMPHET UR QL: POSITIVE
AMPHETAMINES UR QL: POSITIVE
BARBITURATES UR QL SCN: NEGATIVE
BENZODIAZ UR QL SCN: NEGATIVE
BLD GP AB SCN SERPL QL: NEGATIVE
BUPRENORPHINE SERPL-MCNC: NEGATIVE NG/ML
CANNABINOIDS SERPL QL: NEGATIVE
COCAINE UR QL: NEGATIVE
DEPRECATED RDW RBC AUTO: 42.6 FL (ref 37–54)
ERYTHROCYTE [DISTWIDTH] IN BLOOD BY AUTOMATED COUNT: 13.9 % (ref 12.3–15.4)
HCT VFR BLD AUTO: 34.4 % (ref 34–46.6)
HGB BLD-MCNC: 11.9 G/DL (ref 12–15.9)
MCH RBC QN AUTO: 29.5 PG (ref 26.6–33)
MCHC RBC AUTO-ENTMCNC: 34.6 G/DL (ref 31.5–35.7)
MCV RBC AUTO: 85.1 FL (ref 79–97)
METHADONE UR QL SCN: NEGATIVE
OPIATES UR QL: NEGATIVE
OXYCODONE UR QL SCN: NEGATIVE
PCP UR QL SCN: NEGATIVE
PLATELET # BLD AUTO: 398 10*3/MM3 (ref 140–450)
PMV BLD AUTO: 9.1 FL (ref 6–12)
PROPOXYPH UR QL: NEGATIVE
RBC # BLD AUTO: 4.04 10*6/MM3 (ref 3.77–5.28)
RH BLD: POSITIVE
T&S EXPIRATION DATE: NORMAL
TRICYCLICS UR QL SCN: NEGATIVE
WBC NRBC COR # BLD: 8.55 10*3/MM3 (ref 3.4–10.8)

## 2023-03-06 PROCEDURE — 80306 DRUG TEST PRSMV INSTRMNT: CPT | Performed by: STUDENT IN AN ORGANIZED HEALTH CARE EDUCATION/TRAINING PROGRAM

## 2023-03-06 PROCEDURE — 86901 BLOOD TYPING SEROLOGIC RH(D): CPT | Performed by: STUDENT IN AN ORGANIZED HEALTH CARE EDUCATION/TRAINING PROGRAM

## 2023-03-06 PROCEDURE — 86900 BLOOD TYPING SEROLOGIC ABO: CPT | Performed by: STUDENT IN AN ORGANIZED HEALTH CARE EDUCATION/TRAINING PROGRAM

## 2023-03-06 PROCEDURE — 85027 COMPLETE CBC AUTOMATED: CPT | Performed by: STUDENT IN AN ORGANIZED HEALTH CARE EDUCATION/TRAINING PROGRAM

## 2023-03-06 PROCEDURE — G0480 DRUG TEST DEF 1-7 CLASSES: HCPCS | Performed by: STUDENT IN AN ORGANIZED HEALTH CARE EDUCATION/TRAINING PROGRAM

## 2023-03-06 PROCEDURE — 86850 RBC ANTIBODY SCREEN: CPT | Performed by: STUDENT IN AN ORGANIZED HEALTH CARE EDUCATION/TRAINING PROGRAM

## 2023-03-06 RX ORDER — SODIUM CHLORIDE 0.9 % (FLUSH) 0.9 %
10 SYRINGE (ML) INJECTION EVERY 12 HOURS SCHEDULED
Status: DISCONTINUED | OUTPATIENT
Start: 2023-03-06 | End: 2023-03-07

## 2023-03-06 RX ORDER — SODIUM CHLORIDE 0.9 % (FLUSH) 0.9 %
10 SYRINGE (ML) INJECTION AS NEEDED
Status: DISCONTINUED | OUTPATIENT
Start: 2023-03-06 | End: 2023-03-07

## 2023-03-06 RX ORDER — SODIUM CHLORIDE, SODIUM LACTATE, POTASSIUM CHLORIDE, CALCIUM CHLORIDE 600; 310; 30; 20 MG/100ML; MG/100ML; MG/100ML; MG/100ML
125 INJECTION, SOLUTION INTRAVENOUS CONTINUOUS
Status: DISCONTINUED | OUTPATIENT
Start: 2023-03-06 | End: 2023-03-07

## 2023-03-06 RX ORDER — MISOPROSTOL 100 MCG
50 TABLET ORAL ONCE
Status: COMPLETED | OUTPATIENT
Start: 2023-03-06 | End: 2023-03-06

## 2023-03-06 RX ADMIN — Medication 50 MCG: at 21:36

## 2023-03-06 RX ADMIN — SODIUM CHLORIDE, POTASSIUM CHLORIDE, SODIUM LACTATE AND CALCIUM CHLORIDE 125 ML/HR: 600; 310; 30; 20 INJECTION, SOLUTION INTRAVENOUS at 20:17

## 2023-03-06 NOTE — TELEPHONE ENCOUNTER
PT did not feel comfortable with Dr. Villa inducing her on 3/3, so she left. She stated he did an ultrasound and she is only 37 weeks so she was very confused as she thought she was 40 weeks. PT requested a returned call to the number on file to discuss an induction date with Dr. Thomas.

## 2023-03-06 NOTE — TELEPHONE ENCOUNTER
Dr called and spoke with patient. Patient is suppose to come in tonight for an induction.     labor srom

## 2023-03-07 ENCOUNTER — ANESTHESIA (OUTPATIENT)
Dept: LABOR AND DELIVERY | Facility: HOSPITAL | Age: 38
End: 2023-03-07
Payer: MEDICAID

## 2023-03-07 ENCOUNTER — ANESTHESIA EVENT (OUTPATIENT)
Dept: LABOR AND DELIVERY | Facility: HOSPITAL | Age: 38
End: 2023-03-07
Payer: MEDICAID

## 2023-03-07 PROBLEM — O99.323 DRUG DEPENDENCE AFFECTING PREGNANCY IN THIRD TRIMESTER: Status: ACTIVE | Noted: 2022-08-25

## 2023-03-07 PROBLEM — Z91.199 PREGNANCY COMPLICATED BY NONCOMPLIANCE, ANTEPARTUM: Status: ACTIVE | Noted: 2023-03-07

## 2023-03-07 PROBLEM — F19.20 DRUG DEPENDENCE AFFECTING PREGNANCY IN THIRD TRIMESTER: Status: ACTIVE | Noted: 2022-08-25

## 2023-03-07 PROBLEM — O09.93 HIGH-RISK PREGNANCY IN THIRD TRIMESTER: Status: ACTIVE | Noted: 2022-08-25

## 2023-03-07 PROBLEM — O09.899 PREGNANCY COMPLICATED BY NONCOMPLIANCE, ANTEPARTUM: Status: ACTIVE | Noted: 2023-03-07

## 2023-03-07 LAB — Lab: NORMAL

## 2023-03-07 PROCEDURE — 63710000001 DIPHENHYDRAMINE PER 50 MG: Performed by: STUDENT IN AN ORGANIZED HEALTH CARE EDUCATION/TRAINING PROGRAM

## 2023-03-07 PROCEDURE — 51703 INSERT BLADDER CATH COMPLEX: CPT

## 2023-03-07 PROCEDURE — 88307 TISSUE EXAM BY PATHOLOGIST: CPT

## 2023-03-07 PROCEDURE — C1755 CATHETER, INTRASPINAL: HCPCS | Performed by: NURSE ANESTHETIST, CERTIFIED REGISTERED

## 2023-03-07 PROCEDURE — 25010000002 BUTORPHANOL PER 1 MG: Performed by: OBSTETRICS & GYNECOLOGY

## 2023-03-07 PROCEDURE — 0HQ9XZZ REPAIR PERINEUM SKIN, EXTERNAL APPROACH: ICD-10-PCS | Performed by: STUDENT IN AN ORGANIZED HEALTH CARE EDUCATION/TRAINING PROGRAM

## 2023-03-07 PROCEDURE — 59409 OBSTETRICAL CARE: CPT | Performed by: STUDENT IN AN ORGANIZED HEALTH CARE EDUCATION/TRAINING PROGRAM

## 2023-03-07 PROCEDURE — C1755 CATHETER, INTRASPINAL: HCPCS

## 2023-03-07 RX ORDER — ONDANSETRON 4 MG/1
4 TABLET, FILM COATED ORAL EVERY 6 HOURS PRN
Status: DISCONTINUED | OUTPATIENT
Start: 2023-03-07 | End: 2023-03-08 | Stop reason: HOSPADM

## 2023-03-07 RX ORDER — HYDROCORTISONE 25 MG/G
1 CREAM TOPICAL AS NEEDED
Status: DISCONTINUED | OUTPATIENT
Start: 2023-03-07 | End: 2023-03-08 | Stop reason: HOSPADM

## 2023-03-07 RX ORDER — DOCUSATE SODIUM 100 MG/1
100 CAPSULE, LIQUID FILLED ORAL 2 TIMES DAILY
Status: DISCONTINUED | OUTPATIENT
Start: 2023-03-07 | End: 2023-03-08 | Stop reason: HOSPADM

## 2023-03-07 RX ORDER — BUTORPHANOL TARTRATE 1 MG/ML
2 INJECTION, SOLUTION INTRAMUSCULAR; INTRAVENOUS ONCE
Status: COMPLETED | OUTPATIENT
Start: 2023-03-07 | End: 2023-03-07

## 2023-03-07 RX ORDER — ONDANSETRON 2 MG/ML
4 INJECTION INTRAMUSCULAR; INTRAVENOUS EVERY 6 HOURS PRN
Status: DISCONTINUED | OUTPATIENT
Start: 2023-03-07 | End: 2023-03-08 | Stop reason: HOSPADM

## 2023-03-07 RX ORDER — DIPHENHYDRAMINE HCL 25 MG
25 CAPSULE ORAL EVERY 6 HOURS PRN
Status: DISCONTINUED | OUTPATIENT
Start: 2023-03-07 | End: 2023-03-07

## 2023-03-07 RX ORDER — GUAIFENESIN 200 MG/10ML
200 LIQUID ORAL EVERY 4 HOURS PRN
Status: DISCONTINUED | OUTPATIENT
Start: 2023-03-07 | End: 2023-03-08 | Stop reason: HOSPADM

## 2023-03-07 RX ORDER — ACETAMINOPHEN 325 MG/1
650 TABLET ORAL EVERY 6 HOURS PRN
Status: DISCONTINUED | OUTPATIENT
Start: 2023-03-07 | End: 2023-03-08 | Stop reason: HOSPADM

## 2023-03-07 RX ORDER — CARBOPROST TROMETHAMINE 250 UG/ML
250 INJECTION, SOLUTION INTRAMUSCULAR
Status: DISCONTINUED | OUTPATIENT
Start: 2023-03-07 | End: 2023-03-07 | Stop reason: HOSPADM

## 2023-03-07 RX ORDER — LIDOCAINE HYDROCHLORIDE AND EPINEPHRINE 15; 5 MG/ML; UG/ML
INJECTION, SOLUTION EPIDURAL AS NEEDED
Status: DISCONTINUED | OUTPATIENT
Start: 2023-03-07 | End: 2023-03-07 | Stop reason: SURG

## 2023-03-07 RX ORDER — PRENATAL VIT/IRON FUM/FOLIC AC 27MG-0.8MG
1 TABLET ORAL DAILY
Status: DISCONTINUED | OUTPATIENT
Start: 2023-03-08 | End: 2023-03-08 | Stop reason: HOSPADM

## 2023-03-07 RX ORDER — OXYTOCIN/0.9 % SODIUM CHLORIDE 30/500 ML
2 PLASTIC BAG, INJECTION (ML) INTRAVENOUS
Status: DISCONTINUED | OUTPATIENT
Start: 2023-03-07 | End: 2023-03-07

## 2023-03-07 RX ORDER — IBUPROFEN 600 MG/1
600 TABLET ORAL EVERY 6 HOURS PRN
Status: DISCONTINUED | OUTPATIENT
Start: 2023-03-07 | End: 2023-03-08 | Stop reason: HOSPADM

## 2023-03-07 RX ORDER — MISOPROSTOL 200 UG/1
800 TABLET ORAL ONCE AS NEEDED
Status: DISCONTINUED | OUTPATIENT
Start: 2023-03-07 | End: 2023-03-07 | Stop reason: HOSPADM

## 2023-03-07 RX ORDER — SODIUM CHLORIDE 0.9 % (FLUSH) 0.9 %
1-10 SYRINGE (ML) INJECTION AS NEEDED
Status: DISCONTINUED | OUTPATIENT
Start: 2023-03-07 | End: 2023-03-08 | Stop reason: HOSPADM

## 2023-03-07 RX ORDER — EPHEDRINE SULFATE 50 MG/ML
10 INJECTION, SOLUTION INTRAVENOUS
Status: DISCONTINUED | OUTPATIENT
Start: 2023-03-07 | End: 2023-03-07 | Stop reason: HOSPADM

## 2023-03-07 RX ORDER — FERROUS SULFATE TAB EC 324 MG (65 MG FE EQUIVALENT) 324 (65 FE) MG
324 TABLET DELAYED RESPONSE ORAL 2 TIMES DAILY WITH MEALS
Status: DISCONTINUED | OUTPATIENT
Start: 2023-03-07 | End: 2023-03-08 | Stop reason: HOSPADM

## 2023-03-07 RX ORDER — BISACODYL 10 MG
10 SUPPOSITORY, RECTAL RECTAL DAILY PRN
Status: DISCONTINUED | OUTPATIENT
Start: 2023-03-08 | End: 2023-03-08 | Stop reason: HOSPADM

## 2023-03-07 RX ORDER — OXYTOCIN/0.9 % SODIUM CHLORIDE 30/500 ML
250 PLASTIC BAG, INJECTION (ML) INTRAVENOUS CONTINUOUS
Status: ACTIVE | OUTPATIENT
Start: 2023-03-07 | End: 2023-03-07

## 2023-03-07 RX ORDER — OXYTOCIN/0.9 % SODIUM CHLORIDE 30/500 ML
999 PLASTIC BAG, INJECTION (ML) INTRAVENOUS ONCE
Status: DISCONTINUED | OUTPATIENT
Start: 2023-03-07 | End: 2023-03-07 | Stop reason: HOSPADM

## 2023-03-07 RX ORDER — BUPIVACAINE HYDROCHLORIDE 2.5 MG/ML
INJECTION, SOLUTION EPIDURAL; INFILTRATION; INTRACAUDAL AS NEEDED
Status: DISCONTINUED | OUTPATIENT
Start: 2023-03-07 | End: 2023-03-07 | Stop reason: SURG

## 2023-03-07 RX ORDER — METHYLERGONOVINE MALEATE 0.2 MG/ML
200 INJECTION INTRAVENOUS ONCE AS NEEDED
Status: DISCONTINUED | OUTPATIENT
Start: 2023-03-07 | End: 2023-03-07 | Stop reason: HOSPADM

## 2023-03-07 RX ORDER — CALCIUM CARBONATE 200(500)MG
2 TABLET,CHEWABLE ORAL 3 TIMES DAILY PRN
Status: DISCONTINUED | OUTPATIENT
Start: 2023-03-07 | End: 2023-03-08 | Stop reason: HOSPADM

## 2023-03-07 RX ADMIN — DOCUSATE SODIUM 100 MG: 100 CAPSULE, LIQUID FILLED ORAL at 21:17

## 2023-03-07 RX ADMIN — LIDOCAINE HYDROCHLORIDE AND EPINEPHRINE 3 ML: 15; 5 INJECTION, SOLUTION EPIDURAL at 14:18

## 2023-03-07 RX ADMIN — DIPHENHYDRAMINE HYDROCHLORIDE 25 MG: 25 CAPSULE ORAL at 07:21

## 2023-03-07 RX ADMIN — SODIUM CHLORIDE, POTASSIUM CHLORIDE, SODIUM LACTATE AND CALCIUM CHLORIDE 1000 ML: 600; 310; 30; 20 INJECTION, SOLUTION INTRAVENOUS at 14:28

## 2023-03-07 RX ADMIN — SODIUM CHLORIDE, POTASSIUM CHLORIDE, SODIUM LACTATE AND CALCIUM CHLORIDE 125 ML/HR: 600; 310; 30; 20 INJECTION, SOLUTION INTRAVENOUS at 07:21

## 2023-03-07 RX ADMIN — BUPIVACAINE HYDROCHLORIDE 10 ML: 2.5 INJECTION, SOLUTION EPIDURAL; INFILTRATION; INTRACAUDAL; PERINEURAL at 14:23

## 2023-03-07 RX ADMIN — ACETAMINOPHEN 650 MG: 325 TABLET ORAL at 21:50

## 2023-03-07 RX ADMIN — OXYTOCIN-SODIUM CHLORIDE 0.9% IV SOLN 30 UNIT/500ML 2 MILLI-UNITS/MIN: 30-0.9/5 SOLUTION at 07:40

## 2023-03-07 RX ADMIN — BUTORPHANOL TARTRATE 2 MG: 1 INJECTION, SOLUTION INTRAMUSCULAR; INTRAVENOUS at 07:40

## 2023-03-07 RX ADMIN — IBUPROFEN 600 MG: 600 TABLET, FILM COATED ORAL at 21:17

## 2023-03-07 RX ADMIN — GUAIFENESIN 200 MG: 200 SOLUTION ORAL at 21:50

## 2023-03-07 RX ADMIN — FERROUS SULFATE TAB EC 324 MG (65 MG FE EQUIVALENT) 324 MG: 324 (65 FE) TABLET DELAYED RESPONSE at 21:17

## 2023-03-07 NOTE — ANESTHESIA PREPROCEDURE EVALUATION
Anesthesia Evaluation     Patient summary reviewed and Nursing notes reviewed   NPO Solid Status: > 8 hours  NPO Liquid Status: < 2 hours           Airway   Mallampati: II  Neck ROM: full  No difficulty expected  Dental - normal exam     Pulmonary - normal exam   (+) a smoker Current,   Cardiovascular - normal exam        Neuro/Psych  GI/Hepatic/Renal/Endo    (+)   hepatitis C,     Musculoskeletal     Abdominal    Substance History   (+) drug use     OB/GYN    (+) Pregnant,     Comment: Multip  41 weeks      Other                        Anesthesia Plan    ASA 2     epidural       Anesthetic plan, risks, benefits, and alternatives have been provided, discussed and informed consent has been obtained with: patient.        CODE STATUS:    Level Of Support Discussed With: Patient  Code Status (Patient has no pulse and is not breathing): CPR (Attempt to Resuscitate)  Medical Interventions (Patient has pulse or is breathing): Full Support  Release to patient: Routine Release

## 2023-03-07 NOTE — L&D DELIVERY NOTE
[facility]   Vaginal Delivery Note    Patient Name: Latisha Turner  : 1985  MRN: [id number]    Date of Delivery: 3/7/2023     Diagnosis     Pre & Post-Delivery:  Intrauterine pregnancy at 41w1d  Labor status:      Encounter for induction of labor    Chronic hepatitis C complicating pregnancy, antepartum (HCC)    High-risk pregnancy in third trimester    Multigravida of advanced maternal age in second trimester    Systemic lupus erythematosus complicating pregnancy in first trimester (HCC)    Drug dependence affecting pregnancy in third trimester    Post term pregnancy over 40 weeks    History of inadequate prenatal care    Nicotine dependence, cigarettes, with withdrawal    Maternal tobacco use in third trimester    Pregnancy complicated by noncompliance, antepartum             Problem List    Transfer to Postpartum     Review the Delivery Report for details.     Delivery     Delivery: Vaginal, Spontaneous     YOB: 2022    Time of Birth:  Gestational Age 5:19 PM   41w1d     Anesthesia: Epidural     Delivering clinician: Shameka Thomas    Forceps?   No   Vacuum? No    Shoulder dystocia present: Yes Shoulder Dystocia Details  Dystocia Present? Yes   Time head delivered: 3/7/2023  5:19 PM   Gentle attempt at traction, assisted by maternal expulsive forces: Yes   If no, why:    Anterior shoulder:    Time recognized: 3/7/2023  5:19 PM     Time help called: 3/7/2023  5:19 PM  Called by: SHAQ Inman RNC    Time provider arrived:   Provider who arrived:     Time NICU arrived:   NICU staff:     Time additional staff arrived: 3/7/2023  5:19 PM  Additional staff: ALDA Means RNC, MICHAEL Escobar RNC, FERMIN An RNC           Delivery narrative:  Patient pushed to deliver a viable 3740 g male from the BRIA position over an intact perineum via  under epidural anesthesia on 3/8/2023 at 1719.  No noted nuchal cord.  Infant's head was delivered atraumatically. Gently downward traction was applied to the fetal head  without delivery of the anterior shoulder. A shoulder dystocia was identified and announced to the room, including nursing and NICU staff. The mother was instructed to stop pushing. The time was marked. Estimated fetal weight of the infant was 7.5 pounds. Position was noted to be BRIA. The following maneuvers were used to allow delivery of the infant: McRobert's, suprapubic pressure from direction of infant's posterior shoulder towards infant's face, delivery of posterior arm (ultimately successful). At all times baby was guided over perineum using gentle technique, without any lateral traction and with all motions along the vertical axis of the baby and spine. The  Right shoulder was delivered anteriorly and the left shoulder posteriorly. There were no nuchal cords. On delivery infant had poor cry, color, and tone and quickly clamped cord and cut and taken to warmer. Total time of shoulder dystocia was 30 seconds.   Mouth and nose bulb suctioned. Cord blood was obtained and sent.  Cord gases were obtained.  Placenta delivered spontaneously intact and Pitocin was started.  Cervix, vagina, and perineum were examined and a first degree laceration was noted.  3-0 Vicryl was used to repair the laceration in a routine fashion with good hemostasis.   mL; QBL pending, please see nursing documentation.  Apgar scores 8/9.  Mother and infant stable.        Infant     Findings: male  infant     Infant observations: Weight: 3740 g (8 lb 3.9 oz)   Length: 21  in  Observations/Comments:  3740 grams on bedscale = 8 lbs 4 ounces      Apgars: 8  @ 1 minute /    9  @ 5 minutes   Infant Name:      Placenta & Cord         Placenta delivered  Spontaneous  at   3/7/2023  5:24 PM     Cord: 3 vessels  present.   Nuchal Cord?  no   Cord blood obtained: Yes    Cord gases obtained:  Yes    Cord gas results: Venous:  No results found for: PHCVEN    Arterial:  No results found for: PHCART     Repair      Episiotomy: None     No     Lacerations: Yes  Laceration Information  Laceration Repaired?   Perineal: 1st  Yes    Periurethral:       Labial:       Sulcus:       Vaginal:       Cervical:         Suture used for repair: 3-0 Vicryl   Estimated Blood Loss:       Quantitative Blood Loss: Quantitative Blood Loss (mL): 360 mL (03/07/23 2000)        Complications     Shoulder dystocia    Disposition     Mother to Mother Baby/Postpartum  in stable condition currently.  Baby to NICU  in stable condition currently.    Shameka Thomas DO  03/08/23  07:07 CST

## 2023-03-07 NOTE — INTERVAL H&P NOTE
"38 yo  at 41+0 by 13 week US presenting for IOL for approaching post dates, lupus. Patient had been scheduled on Friday at 40+4 but left AMA after becoming upset about +UDS for methamphetamines. US prior to leaving Friday showed fetus at 11%ile. Pregnancy c/b lupus, AMA, substance use, tobacco use, inadequate PNC, noncompliance, chronic hepatitis C. GBS neg. 1 hr WNL.     Today patient sent message to clinic that she didn't want to be induced Friday because she didn't know it would be with Dr. Villa (had previously been informed and requested to keep date). Then stated she had not wanted to be induced and was very anxious regarding +UDS and if she would be able to take baby home. Discussed policy for NICU admission for monitoring for [facility] and case management involvement and from there would depend on her history and their investigation but that typically we want babies to go with their parents if this is a safe option. She said she would call back and let me know about the induction after I strongly recommended induction today due to 41 weeks gestation, AMA, lupus and small for gestational age fetus. When she called and talked to my assistant initially said she would not come for induction, but when I got on the phone immediately said they would come in.    Vitals:    23   BP: 119/72   BP Location: Right arm   Patient Position: Lying   Pulse: 77   Resp: 20   Temp: 98 °F (36.7 °C)   TempSrc: Oral   SpO2: 97%   Weight: 95.3 kg (210 lb)   Height: 160 cm (63\")     Gen: well appearing, anxious, NAD  CV: RRR  Chest: no increased work of breathing  Abd: soft, gravid  Ext: nontender    No results found for: WBC, RBC, HGB, HCT, MCV, MCH, MCHC, RDW, RDWSD, MPV, PLT, NEUTRORELPCT, LYMPHORELPCT, MONORELPCT, EOSRELPCT, BASORELPCT, AUTOIGPER, NEUTROABS, LYMPHSABS, MONOSABS, EOSABS, BASOSABS, AUTOIGNUM, NRBC    SVE: pending    38 yo  at 41+0 by 13 week US presenting for IOL for approaching post dates, " lupus.   Patient amenable to induction  Discussed cannot leave floor to smoke, use tobacco, offered nicotine patch  Will having nursing check cervix and determine induction plan (cervical ripening vs. Pitocin).  Understands infant will have to go to NICU due to +UDS.  Reactive and reassuring tracing with intermittent contractions.        This document has been electronically signed by Shameka Thomas DO on March 6, 2023 20:42 CST

## 2023-03-07 NOTE — PLAN OF CARE
Problem: Adult Inpatient Plan of Care  Goal: Plan of Care Review  Outcome: Ongoing, Progressing  Flowsheets (Taken 3/7/2023 9819)  Progress: no change  Plan of Care Reviewed With:   patient   significant other  Outcome Evaluation: Pt came in to L&D for induction last night. Cytotec given during the night. SO at bedside interacting with pt. Pt starting to feel her contractions more.   Goal Outcome Evaluation:  Plan of Care Reviewed With: patient, significant other        Progress: no change  Outcome Evaluation: Pt came in to L&D for induction last night. Cytotec given during the night. SO at bedside interacting with pt. Pt starting to feel her contractions more.

## 2023-03-07 NOTE — ANESTHESIA PROCEDURE NOTES
Labor Epidural      Patient reassessed immediately prior to procedure    Start Time: 3/7/2023 2:07 PM  Stop Time: 3/7/2023 2:18 PM  Performed By  CRNA/CAA: Ellyn Cotter CRNA  Preanesthetic Checklist  Completed: patient identified, IV checked, site marked, risks and benefits discussed, surgical consent, monitors and equipment checked, pre-op evaluation and timeout performed  Prep:  Pt Position:sitting  Sterile Tech:gloves, mask and sterile barrier  Prep:povidone-iodine 7.5% surgical scrub  Monitoring:blood pressure monitoring and continuous pulse oximetry  Epidural Block Procedure:  Approach:midline  Guidance:landmark technique and palpation technique  Location:L1-L2  Needle Type:Tuohy  Needle Gauge:17 G  Loss of Resistance Medium: saline  Loss of Resistance: 7cm  Cath Depth at skin:11 cm  Paresthesia: none  Aspiration:negative  Test Dose:negative  Number of Attempts: 2  Post Assessment:  Dressing:occlusive dressing applied and secured with tape  Pt Tolerance:patient tolerated the procedure well with no apparent complications  Complications:no

## 2023-03-07 NOTE — SIGNIFICANT NOTE
"Pt becoming very agitated wanting to remove EFM.  Explained to patient that hospital policy is to be on EFM when on Pitocin. Pt states she \"doesn't care about the f__ing policy\"   "

## 2023-03-08 VITALS
TEMPERATURE: 97.9 F | SYSTOLIC BLOOD PRESSURE: 127 MMHG | RESPIRATION RATE: 20 BRPM | WEIGHT: 210 LBS | HEIGHT: 63 IN | DIASTOLIC BLOOD PRESSURE: 63 MMHG | HEART RATE: 76 BPM | OXYGEN SATURATION: 97 % | BODY MASS INDEX: 37.21 KG/M2

## 2023-03-08 PROCEDURE — 99238 HOSP IP/OBS DSCHRG MGMT 30/<: CPT | Performed by: STUDENT IN AN ORGANIZED HEALTH CARE EDUCATION/TRAINING PROGRAM

## 2023-03-08 RX ORDER — NIFEDIPINE 30 MG/1
30 TABLET, EXTENDED RELEASE ORAL
Status: DISCONTINUED | OUTPATIENT
Start: 2023-03-08 | End: 2023-03-08 | Stop reason: HOSPADM

## 2023-03-08 RX ORDER — NIFEDIPINE 30 MG/1
30 TABLET, FILM COATED, EXTENDED RELEASE ORAL
Qty: 30 TABLET | Refills: 1 | Status: SHIPPED | OUTPATIENT
Start: 2023-03-08

## 2023-03-08 RX ORDER — ACETAMINOPHEN 325 MG/1
650 TABLET ORAL EVERY 6 HOURS PRN
Qty: 30 TABLET | Refills: 1 | Status: SHIPPED | OUTPATIENT
Start: 2023-03-08

## 2023-03-08 RX ORDER — IBUPROFEN 600 MG/1
600 TABLET ORAL EVERY 6 HOURS PRN
Qty: 30 TABLET | Refills: 1 | Status: SHIPPED | OUTPATIENT
Start: 2023-03-08

## 2023-03-08 RX ORDER — PSEUDOEPHEDRINE HCL 30 MG
100 TABLET ORAL 2 TIMES DAILY
Qty: 30 EACH | Refills: 1 | Status: SHIPPED | OUTPATIENT
Start: 2023-03-08

## 2023-03-08 RX ADMIN — ACETAMINOPHEN 650 MG: 325 TABLET ORAL at 04:33

## 2023-03-08 RX ADMIN — FERROUS SULFATE TAB EC 324 MG (65 MG FE EQUIVALENT) 324 MG: 324 (65 FE) TABLET DELAYED RESPONSE at 09:23

## 2023-03-08 RX ADMIN — IBUPROFEN 600 MG: 600 TABLET, FILM COATED ORAL at 03:05

## 2023-03-08 RX ADMIN — DOCUSATE SODIUM 100 MG: 100 CAPSULE, LIQUID FILLED ORAL at 09:23

## 2023-03-08 NOTE — NURSING NOTE
Reviewed discharge instructions with patient including importance of taking medications as prescribed monitoring blood pressure daily. Patient states she does have a blood pressure monitor at home.  Reviewed pre e warning signs.  Allowed for opportunity for questions. All questions answered. Patient verbalized understanding and is agreeable to discharge.

## 2023-03-08 NOTE — PAYOR COMM NOTE
"Amelie Thompsonmore  [facility]  Case Management Extender  [phone] phone  [phone] fax      Ref# [id number]    Latisha Turner (37 y.o. Female)     Date of Birth   1985    Social Security Number       Address   [address] [id number]    Home Phone   [phone]    MRN   [id number]       Christianity   [employer]ism    Marital Status   Significant Other                            Admission Date   3/6/23    Admission Type   Elective    Admitting Provider   Shameka Thomas DO    Attending Provider   Shameka Thomas DO    Department, Room/Bed   [facility] MOTHER BABY, M751/1       Discharge Date       Discharge Disposition   Home or Self Care    Discharge Destination                               Attending Provider: Shameka Thomas DO    Allergies: No Known Allergies    Isolation: None   Infection: None   Code Status: CPR    Ht: 160 cm (63\")   Wt: 95.3 kg (210 lb)    Admission Cmt: None   Principal Problem: Encounter for induction of labor [Z34.90]                 Active Insurance as of 3/6/2023     Primary Coverage     Payor Plan Insurance Group Employer/Plan Group    ANTHEM MEDICAID ANTHEM MEDICAID [id number]     Payor Plan Address Payor Plan Phone Number Payor Plan Fax Number Effective Dates    [address] [address] [phone]  2019 - None Entered    [facility] [id number]       Subscriber Name Subscriber Birth Date Member ID       LATISHA TURNER 1985 [id number]                 Emergency Contacts      (Rel.) Home Phone Work Phone Mobile Phone    Landry Lora (Partner) -- -- [phone]               History & Physical      Shameka Thomas DO at 23 2033          36 yo  at 41+0 by 13 week US presenting for IOL for approaching post dates, lupus. Patient had been scheduled on Friday at 40+4 but left AMA after becoming upset about +UDS for methamphetamines. US prior to leaving Friday showed fetus at 11%ile. " "Pregnancy c/b lupus, AMA, substance use, tobacco use, inadequate PNC, noncompliance, chronic hepatitis C. GBS neg. 1 hr WNL.     Today patient sent message to clinic that she didn't want to be induced Friday because she didn't know it would be with Dr. Villa (had previously been informed and requested to keep date). Then stated she had not wanted to be induced and was very anxious regarding +UDS and if she would be able to take baby home. Discussed policy for NICU admission for monitoring for [facility]SCAR and case management involvement and from there would depend on her history and their investigation but that typically we want babies to go with their parents if this is a safe option. She said she would call back and let me know about the induction after I strongly recommended induction today due to 41 weeks gestation, AMA, lupus and small for gestational age fetus. When she called and talked to my assistant initially said she would not come for induction, but when I got on the phone immediately said they would come in.    Vitals:    23   BP: 119/72   BP Location: Right arm   Patient Position: Lying   Pulse: 77   Resp: 20   Temp: 98 °F (36.7 °C)   TempSrc: Oral   SpO2: 97%   Weight: 95.3 kg (210 lb)   Height: 160 cm (63\")     Gen: well appearing, anxious, NAD  CV: RRR  Chest: no increased work of breathing  Abd: soft, gravid  Ext: nontender    No results found for: WBC, RBC, HGB, HCT, MCV, MCH, MCHC, RDW, RDWSD, MPV, PLT, NEUTRORELPCT, LYMPHORELPCT, MONORELPCT, EOSRELPCT, BASORELPCT, AUTOIGPER, NEUTROABS, LYMPHSABS, MONOSABS, EOSABS, BASOSABS, AUTOIGNUM, NRBC    SVE: pending    36 yo  at 41+0 by 13 week US presenting for IOL for approaching post dates, lupus.   Patient amenable to induction  Discussed cannot leave floor to smoke, use tobacco, offered nicotine patch  Will having nursing check cervix and determine induction plan (cervical ripening vs. Pitocin).  Understands infant will have to go to NICU due " to +UDS.  Reactive and reassuring tracing with intermittent contractions.        This document has been electronically signed by Shameka Thomas DO on 2023 20:42 CST      Electronically signed by Shameka Thomas DO at 23   Source Note          [facility]  Obstetric History and Physical    Chief complaint: Here for induction for 40-4/7 and lupus      Subjective:   HPI:    Patient is a 37 y.o. female  currently at 40w4d, who presents with chief complaint of here for induction of labor for 40-4/7 and lupus.  Pregnancy is well dated by 13 weeks Feehan.  Patient became upset when urine drug screen positive.  For amphetamine and methamphetamine although apparently not totally unexpected.  We have long discussion about this further complicating issue was tobacco in nicotine dependency she kept wanting to go smoke.  This was despite having IV in place and being explained to her that this was a tobacco free campus.  We obtained an ultrasound that given estimated fetal weight of 6 pounds 15 ounces.  She said that if she felt this made her not 40-4/7.  I said with early ultrasound I think the pregnancy is well dated in the weight of the baby is inconsistent with her being 40-4/7 and I think all her risk factors places her at very high risk for stillbirth and she needed to stay in plan for delivery.  Despite this she left AMA I    Her prenatal care is spotting prenatal care at [facility].  Past OB history is noted below.      The following portions of the patients history were reviewed and updated as appropriate:   current medications, allergies, past medical history, past surgical history, past family history, past social history and current problem list.     Prenatal Information:  Prenatal Results     POC Urine Glucose/Protein     Test Value Reference Range Date Time    Urine Glucose        Urine Protein              Initial Prenatal Labs     Test Value Reference Range  Date Time    Hemoglobin  13.3 g/dL 12.0 - 15.9 08/03/22 1314    Hematocrit  39.0 % 34.0 - 46.6 08/03/22 1314    Platelets  396 10*3/mm3 140 - 450 08/03/22 1314    Rubella IgG  5.02 index Immune >0.99 08/03/22 1314    Hepatitis B SAg  Non-Reactive  Non-Reactive 08/03/22 1314    Hepatitis C Ab  Reactive  Non-Reactive 08/03/22 1314    RPR  Non-Reactive  Non-Reactive 08/03/22 1314    T. Pallidum Ab         ABO  A   03/03/23 0454    Rh  Positive   03/03/23 0454    Antibody Screen  Negative   08/03/22 1314    HIV  Non-Reactive  Non-Reactive 08/03/22 1314    Urine Culture  50,000 CFU/mL Normal Urogenital Denia   08/03/22 1314    Gonorrhea  Negative  Negative 08/03/22 1314    Chlamydia  Negative  Negative 08/03/22 1314    TSH        HgB A1c               2nd and 3rd Trimester     Test Value Reference Range Date Time    Hemoglobin (repeated)  11.6 g/dL 12.0 - 15.9 03/03/23 0454       11.9 g/dL 12.0 - 15.9 12/05/22 1019    Hematocrit (repeated)  34.1 % 34.0 - 46.6 03/03/23 0454       33.7 % 34.0 - 46.6 12/05/22 1019    Platelets   384 10*3/mm3 140 - 450 03/03/23 0454       391 10*3/mm3 140 - 450 12/05/22 1019       396 10*3/mm3 140 - 450 08/03/22 1314    GCT  114 mg/dL 65 - 139 12/05/22 1019    Antibody Screen (repeated)  Negative   03/03/23 0454    GTT Fasting        GTT 1 Hr        GTT 2 Hr        GTT 3 Hr        Group B Strep  Negative  Negative 02/21/23 1447          Drug Screening     Test Value Reference Range Date Time    Amphetamine Screen  Positive  Negative 03/03/23 0539       Negative  Negative 08/03/22 1314    Barbiturate Screen  Negative  Negative 03/03/23 0539       Negative  Negative 08/03/22 1314    Benzodiazepine Screen  Negative  Negative 03/03/23 0539       Negative  Negative 08/03/22 1314    Methadone Screen  Negative  Negative 03/03/23 0539       Negative  Negative 08/03/22 1314    Phencyclidine Screen  Negative  Negative 03/03/23 0539       Negative  Negative 08/03/22 1314    Opiates Screen  Negative   Negative 03/03/23 0539       Negative  Negative 08/03/22 1314    THC Screen  Negative  Negative 03/03/23 0539       Negative  Negative 08/03/22 1314    Cocaine Screen  Negative  Negative 03/03/23 0539       Negative  Negative 08/03/22 1314    Propoxyphene Screen  Negative  Negative 03/03/23 0539       Negative  Negative 08/03/22 1314    Buprenorphine Screen  Negative  Negative 03/03/23 0539       Negative  Negative 08/03/22 1314    Methamphetamine Screen  Positive  Negative 03/03/23 0539       Negative  Negative 08/03/22 1314    Oxycodone Screen  Negative  Negative 03/03/23 0539       Negative  Negative 08/03/22 1314    Tricyclic Antidepressants Screen  Negative  Negative 03/03/23 0539       Negative  Negative 08/03/22 1314          Other (Risk screening)     Test Value Reference Range Date Time    Varicella IgG        Parvovirus IgG        CMV IgG        Cystic Fibrosis        Hemoglobin electrophoresis        NIPT        MSAFP-4        AFP (for NTD only)              Legend    ^: Historical                      External Prenatal Results     Pregnancy Outside Results - Transcribed From Office Records - See Scanned Records For Details     Test Value Date Time    ABO  A  03/03/23 0454    Rh  Positive  03/03/23 0454    Antibody Screen  Negative  03/03/23 0454       Negative  08/03/22 1314    Varicella IgG       Rubella  5.02 index 08/03/22 1314    Hgb  11.6 g/dL 03/03/23 0454       11.9 g/dL 12/05/22 1019       13.3 g/dL 08/03/22 1314    Hct  34.1 % 03/03/23 0454       33.7 % 12/05/22 1019       39.0 % 08/03/22 1314    Glucose Fasting GTT       Glucose Tolerance Test 1 hour       Glucose Tolerance Test 3 hour       Gonorrhea (discrete)  Negative  08/03/22 1314    Chlamydia (discrete)  Negative  08/03/22 1314    RPR  Non-Reactive  08/03/22 1314    VDRL       Syphilis Antibody       HBsAg  Non-Reactive  08/03/22 1314    Herpes Simplex Virus PCR       Herpes Simplex VIrus Culture       HIV  Non-Reactive  08/03/22 1314     Hep C RNA Quant PCR       Hep C Antibody  Reactive  22 1314    AFP       Group B Strep  Negative  23 1447    GBS Susceptibility to Clindamycin       GBS Susceptibility to Erythromycin       Fetal Fibronectin       Genetic Testing, Maternal Blood             Drug Screening     Test Value Date Time    Urine Drug Screen       Amphetamine Screen  Positive  23 0539       Negative  22 1314    Barbiturate Screen  Negative  23 0539       Negative  22 1314    Benzodiazepine Screen  Negative  23 0539       Negative  22 1314    Methadone Screen  Negative  23 0539       Negative  22 1314    Phencyclidine Screen  Negative  23 0539       Negative  22 1314    Opiates Screen  Negative  23 0539       Negative  22 1314    THC Screen  Negative  23 0539       Negative  22 1314    Cocaine Screen       Propoxyphene Screen  Negative  23 0539       Negative  22 1314    Buprenorphine Screen  Negative  23 0539       Negative  22 1314    Methamphetamine Screen       Oxycodone Screen  Negative  23 0539       Negative  22 1314    Tricyclic Antidepressants Screen  Negative  23 0539       Negative  22 1314          Legend    ^: Historical                         Past OB History:     OB History    Para Term  AB Living   3 2 2 0 0 2   SAB IAB Ectopic Molar Multiple Live Births   0 0 0 0 0 2      # Outcome Date GA Lbr Juan/2nd Weight Sex Delivery Anes PTL Lv   3 Current            2 Term 08/06/10 38w4d  3459 g (7 lb 10 oz) F Vag-Spont EPI  LESLEY      Complications: Smoker   1 Term 09 39w1d  3289 g (7 lb 4 oz) M Vag-Spont EPI  LESLEY      Birth Comments: nuchal cord x 1.  unknown GBS      Complications: Meconium stained amniotic fluid, PROM (premature rupture of membranes)       Past Medical History: Past Medical History:   Diagnosis Date   • Abnormal Pap smear of cervix    • Chronic hepatitis C  (HCC)    • Lupus (HCC)    • Substance abuse (HCC)    • Trauma    • Varicella       Past Surgical History Past Surgical History:   Procedure Laterality Date   • WISDOM TOOTH EXTRACTION     • WRIST FRACTURE SURGERY Left       Family History: Family History   Problem Relation Age of Onset   • Diabetes Mother    • Thyroid disease Mother    • No Known Problems Daughter    • No Known Problems Son    • Diabetes Maternal Grandmother    • Heart disease Maternal Grandmother    • Kidney failure Maternal Grandmother    • Cancer Maternal Grandfather    • Colon cancer Neg Hx    • Colon polyps Neg Hx    • Esophageal cancer Neg Hx    • Liver cancer Neg Hx    • Liver disease Neg Hx    • Rectal cancer Neg Hx    • Stomach cancer Neg Hx       Social History:  reports that she has been smoking cigarettes. She has been smoking an average of .5 packs per day. She has never used smokeless tobacco.   reports that she does not currently use alcohol.   reports that she does not currently use drugs after having used the following drugs: Methamphetamines.        General ROS: Pertinent items are noted in HPI    Home Medications:  aspirin and prenatal vitamin    Allergies:  No Known Allergies    Objective        Vital Signs Range for the last 24 hours  Temperature: Temp:  [98 °F (36.7 °C)] 98 °F (36.7 °C)   Temp Source: Temp src: Oral   BP: BP: (128)/(74) 128/74   Pulse: Heart Rate:  [82] 82   Respirations: Resp:  [16] 16   SPO2: SpO2:  [99 %] 99 %     Physical Examination:   General appearance - alert, well appearing, and in no distress  Mental status - alert, oriented to person, place, and time  Chest - clear to auscultation, no wheezes, rales or rhonchi, symmetric air entry  Heart - normal rate, regular rhythm, normal S1, S2, no murmurs, rubs, clicks or gallops  Abdomen - soft, nontender, nondistended, no masses or organomegaly  Pelvic - normal external genitalia, vulva, vagina, cervix, uterus and adnexa  Back exam - full range of motion, no  tenderness, palpable spasm or pain on motion  Neurological - alert, oriented, normal speech, no focal findings or movement disorder noted  Extremities - peripheral pulses normal, no pedal edema, no clubbing or cyanosis  Skin - normal coloration and turgor, no rashes, no suspicious skin lesions noted  Cervix 1-2 very posterior 50% soft -2 I advised oral Cytotec before starting oxytocin    Presentation:  Cephalic by ultrasound   Cervix: Method: sterile exam per physician   Dilation: Cervical Dilation (cm): 1-2   Effacement:     Station:         Fetal Heart Rate Assessment   Method: Fetal HR Assessment Method: external   Beats/min: Fetal HR (beats/min): 130   Baseline: Fetal HR Baseline: normal range   Variability: Fetal HR Variability: moderate (amplitude range 6 to 25 bpm)   Accels: Fetal HR Accelerations: greater than/equal to 15 bpm, lasting at least 15 seconds   Decels: Fetal HR Decelerations: absent   Tracing Category:       Uterine Assessment   Method: Method: external tocotransducer   Frequency (min): Contraction Frequency (Minutes): occ   Ctx Count in 10 min: Contractions in 10 Minutes: 4   Duration:     Intensity: Contraction Intensity: mild by palpation   [place] Units:        GBS is  negative 2/21/2023    Assessment & Plan       ASSESSMENT with PLAN:   Active Hospital Problems    Diagnosis  POA   • Maternal history of systemic lupus erythematosus (SLE) [Z82.69]  Not Applicable     3/3/2023 history of lupus primarily joint pain no renal problems     • Post term pregnancy over 40 weeks [O48.0]  Unknown   • 40 weeks gestation of pregnancy [Z3A.40]  Not Applicable   • History of inadequate prenatal care [O09.30]  Not Applicable   • Nicotine dependence, cigarettes, with withdrawal [F17.213]  Unknown   • High-risk pregnancy in second trimester [O09.92]  Not Applicable   • Maternal tobacco use in second trimester [O99.332]  Yes   • Multigravida of advanced maternal age in second trimester [O09.522]  Yes      NIPS: low risk male fetus     • Drug use complicating pregnancy in first trimester [O99.321]  Yes     Reports meth use early pregnancy  Neg UDS at IOB     • Chronic hepatitis C complicating pregnancy, antepartum (HCC) [O98.419, B18.2]  Yes     Has not been treated; needs referral to GI for tx after delivery     • Chronic hepatitis C without hepatic coma (HCC) [B18.2]  Yes               Plan of care has been reviewed with patient and patient agrees.   Risks, benefits of treatment plan have been discussed.  All questions have been answered.        Electronically signed by Robles Villa MD, 23, 9:21 AM CST.      Electronically signed by Robles Villa MD at 23 0962             Robles Villa MD at 23 0840          [facility]  Obstetric History and Physical    Chief complaint: Here for induction for 40-4/7 and lupus      Subjective:   HPI:    Patient is a 37 y.o. female  currently at 40w4d, who presents with chief complaint of here for induction of labor for 40-4/7 and lupus.  Pregnancy is well dated by 13 weeks Feehan.  Patient became upset when urine drug screen positive.  For amphetamine and methamphetamine although apparently not totally unexpected.  We have long discussion about this further complicating issue was tobacco in nicotine dependency she kept wanting to go smoke.  This was despite having IV in place and being explained to her that this was a tobacco free campus.  We obtained an ultrasound that given estimated fetal weight of 6 pounds 15 ounces.  She said that if she felt this made her not 40-4/7.  I said with early ultrasound I think the pregnancy is well dated in the weight of the baby is inconsistent with her being 40-4/7 and I think all her risk factors places her at very high risk for stillbirth and she needed to stay in plan for delivery.  Despite this she left AMA I    Her prenatal care is spotting prenatal care at [facility].  Past OB  history is noted below.      The following portions of the patients history were reviewed and updated as appropriate:   current medications, allergies, past medical history, past surgical history, past family history, past social history and current problem list.     Prenatal Information:  Prenatal Results     POC Urine Glucose/Protein     Test Value Reference Range Date Time    Urine Glucose        Urine Protein              Initial Prenatal Labs     Test Value Reference Range Date Time    Hemoglobin  13.3 g/dL 12.0 - 15.9 08/03/22 1314    Hematocrit  39.0 % 34.0 - 46.6 08/03/22 1314    Platelets  396 10*3/mm3 140 - 450 08/03/22 1314    Rubella IgG  5.02 index Immune >0.99 08/03/22 1314    Hepatitis B SAg  Non-Reactive  Non-Reactive 08/03/22 1314    Hepatitis C Ab  Reactive  Non-Reactive 08/03/22 1314    RPR  Non-Reactive  Non-Reactive 08/03/22 1314    T. Pallidum Ab         ABO  A   03/03/23 0454    Rh  Positive   03/03/23 0454    Antibody Screen  Negative   08/03/22 1314    HIV  Non-Reactive  Non-Reactive 08/03/22 1314    Urine Culture  50,000 CFU/mL Normal Urogenital Denia   08/03/22 1314    Gonorrhea  Negative  Negative 08/03/22 1314    Chlamydia  Negative  Negative 08/03/22 1314    TSH        HgB A1c               2nd and 3rd Trimester     Test Value Reference Range Date Time    Hemoglobin (repeated)  11.6 g/dL 12.0 - 15.9 03/03/23 0454       11.9 g/dL 12.0 - 15.9 12/05/22 1019    Hematocrit (repeated)  34.1 % 34.0 - 46.6 03/03/23 0454       33.7 % 34.0 - 46.6 12/05/22 1019    Platelets   384 10*3/mm3 140 - 450 03/03/23 0454       391 10*3/mm3 140 - 450 12/05/22 1019       396 10*3/mm3 140 - 450 08/03/22 1314    GCT  114 mg/dL 65 - 139 12/05/22 1019    Antibody Screen (repeated)  Negative   03/03/23 0454    GTT Fasting        GTT 1 Hr        GTT 2 Hr        GTT 3 Hr        Group B Strep  Negative  Negative 02/21/23 1447          Drug Screening     Test Value Reference Range Date Time    Amphetamine Screen   Positive  Negative 03/03/23 0539       Negative  Negative 08/03/22 1314    Barbiturate Screen  Negative  Negative 03/03/23 0539       Negative  Negative 08/03/22 1314    Benzodiazepine Screen  Negative  Negative 03/03/23 0539       Negative  Negative 08/03/22 1314    Methadone Screen  Negative  Negative 03/03/23 0539       Negative  Negative 08/03/22 1314    Phencyclidine Screen  Negative  Negative 03/03/23 0539       Negative  Negative 08/03/22 1314    Opiates Screen  Negative  Negative 03/03/23 0539       Negative  Negative 08/03/22 1314    THC Screen  Negative  Negative 03/03/23 0539       Negative  Negative 08/03/22 1314    Cocaine Screen  Negative  Negative 03/03/23 0539       Negative  Negative 08/03/22 1314    Propoxyphene Screen  Negative  Negative 03/03/23 0539       Negative  Negative 08/03/22 1314    Buprenorphine Screen  Negative  Negative 03/03/23 0539       Negative  Negative 08/03/22 1314    Methamphetamine Screen  Positive  Negative 03/03/23 0539       Negative  Negative 08/03/22 1314    Oxycodone Screen  Negative  Negative 03/03/23 0539       Negative  Negative 08/03/22 1314    Tricyclic Antidepressants Screen  Negative  Negative 03/03/23 0539       Negative  Negative 08/03/22 1314          Other (Risk screening)     Test Value Reference Range Date Time    Varicella IgG        Parvovirus IgG        CMV IgG        Cystic Fibrosis        Hemoglobin electrophoresis        NIPT        MSAFP-4        AFP (for NTD only)              Legend    ^: Historical                      External Prenatal Results     Pregnancy Outside Results - Transcribed From Office Records - See Scanned Records For Details     Test Value Date Time    ABO  A  03/03/23 0454    Rh  Positive  03/03/23 0454    Antibody Screen  Negative  03/03/23 0454       Negative  08/03/22 1314    Varicella IgG       Rubella  5.02 index 08/03/22 1314    Hgb  11.6 g/dL 03/03/23 0454       11.9 g/dL 12/05/22 1019       13.3 g/dL 08/03/22 1314    Hct   34.1 % 23 0454       33.7 % 22 1019       39.0 % 22 1314    Glucose Fasting GTT       Glucose Tolerance Test 1 hour       Glucose Tolerance Test 3 hour       Gonorrhea (discrete)  Negative  22 1314    Chlamydia (discrete)  Negative  22 1314    RPR  Non-Reactive  22 1314    VDRL       Syphilis Antibody       HBsAg  Non-Reactive  22 1314    Herpes Simplex Virus PCR       Herpes Simplex VIrus Culture       HIV  Non-Reactive  22 1314    Hep C RNA Quant PCR       Hep C Antibody  Reactive  22 1314    AFP       Group B Strep  Negative  23 1447    GBS Susceptibility to Clindamycin       GBS Susceptibility to Erythromycin       Fetal Fibronectin       Genetic Testing, Maternal Blood             Drug Screening     Test Value Date Time    Urine Drug Screen       Amphetamine Screen  Positive  23 0539       Negative  22 1314    Barbiturate Screen  Negative  23 0539       Negative  22 1314    Benzodiazepine Screen  Negative  23 0539       Negative  22 1314    Methadone Screen  Negative  23 0539       Negative  22 1314    Phencyclidine Screen  Negative  23 0539       Negative  22 1314    Opiates Screen  Negative  23 0539       Negative  22 1314    THC Screen  Negative  23 0539       Negative  22 1314    Cocaine Screen       Propoxyphene Screen  Negative  23 0539       Negative  22 1314    Buprenorphine Screen  Negative  23 0539       Negative  22 1314    Methamphetamine Screen       Oxycodone Screen  Negative  23 0539       Negative  22 1314    Tricyclic Antidepressants Screen  Negative  23 0539       Negative  22 1314          Legend    ^: Historical                         Past OB History:     OB History    Para Term  AB Living   3 2 2 0 0 2   SAB IAB Ectopic Molar Multiple Live Births   0 0 0 0 0 2      # Outcome Date GA Lbr  Juan/2nd Weight Sex Delivery Anes PTL Lv   3 Current            2 Term 08/06/10 38w4d  3459 g (7 lb 10 oz) F Vag-Spont EPI  LESLEY      Complications: Smoker   1 Term 08/18/09 39w1d  3289 g (7 lb 4 oz) M Vag-Spont EPI  LESLEY      Birth Comments: nuchal cord x 1.  unknown GBS      Complications: Meconium stained amniotic fluid, PROM (premature rupture of membranes)       Past Medical History: Past Medical History:   Diagnosis Date   • Abnormal Pap smear of cervix    • Chronic hepatitis C (HCC)    • Lupus (HCC)    • Substance abuse (HCC)    • Trauma    • Varicella       Past Surgical History Past Surgical History:   Procedure Laterality Date   • WISDOM TOOTH EXTRACTION     • WRIST FRACTURE SURGERY Left       Family History: Family History   Problem Relation Age of Onset   • Diabetes Mother    • Thyroid disease Mother    • No Known Problems Daughter    • No Known Problems Son    • Diabetes Maternal Grandmother    • Heart disease Maternal Grandmother    • Kidney failure Maternal Grandmother    • Cancer Maternal Grandfather    • Colon cancer Neg Hx    • Colon polyps Neg Hx    • Esophageal cancer Neg Hx    • Liver cancer Neg Hx    • Liver disease Neg Hx    • Rectal cancer Neg Hx    • Stomach cancer Neg Hx       Social History:  reports that she has been smoking cigarettes. She has been smoking an average of .5 packs per day. She has never used smokeless tobacco.   reports that she does not currently use alcohol.   reports that she does not currently use drugs after having used the following drugs: Methamphetamines.        General ROS: Pertinent items are noted in HPI    Home Medications:  aspirin and prenatal vitamin    Allergies:  No Known Allergies    Objective        Vital Signs Range for the last 24 hours  Temperature: Temp:  [98 °F (36.7 °C)] 98 °F (36.7 °C)   Temp Source: Temp src: Oral   BP: BP: (128)/(74) 128/74   Pulse: Heart Rate:  [82] 82   Respirations: Resp:  [16] 16   SPO2: SpO2:  [99 %] 99 %     Physical  Examination:   General appearance - alert, well appearing, and in no distress  Mental status - alert, oriented to person, place, and time  Chest - clear to auscultation, no wheezes, rales or rhonchi, symmetric air entry  Heart - normal rate, regular rhythm, normal S1, S2, no murmurs, rubs, clicks or gallops  Abdomen - soft, nontender, nondistended, no masses or organomegaly  Pelvic - normal external genitalia, vulva, vagina, cervix, uterus and adnexa  Back exam - full range of motion, no tenderness, palpable spasm or pain on motion  Neurological - alert, oriented, normal speech, no focal findings or movement disorder noted  Extremities - peripheral pulses normal, no pedal edema, no clubbing or cyanosis  Skin - normal coloration and turgor, no rashes, no suspicious skin lesions noted  Cervix 1-2 very posterior 50% soft -2 I advised oral Cytotec before starting oxytocin    Presentation:  Cephalic by ultrasound   Cervix: Method: sterile exam per physician   Dilation: Cervical Dilation (cm): 1-2   Effacement:     Station:         Fetal Heart Rate Assessment   Method: Fetal HR Assessment Method: external   Beats/min: Fetal HR (beats/min): 130   Baseline: Fetal HR Baseline: normal range   Variability: Fetal HR Variability: moderate (amplitude range 6 to 25 bpm)   Accels: Fetal HR Accelerations: greater than/equal to 15 bpm, lasting at least 15 seconds   Decels: Fetal HR Decelerations: absent   Tracing Category:       Uterine Assessment   Method: Method: external tocotransducer   Frequency (min): Contraction Frequency (Minutes): occ   Ctx Count in 10 min: Contractions in 10 Minutes: 4   Duration:     Intensity: Contraction Intensity: mild by palpation   [place] Units:        GBS is  negative 2/21/2023    Assessment & Plan       ASSESSMENT with PLAN:   Active Hospital Problems    Diagnosis  POA   • Maternal history of systemic lupus erythematosus (SLE) [Z82.69]  Not Applicable     3/3/2023 history of lupus primarily  joint pain no renal problems     • Post term pregnancy over 40 weeks [O48.0]  Unknown   • 40 weeks gestation of pregnancy [Z3A.40]  Not Applicable   • History of inadequate prenatal care [O09.30]  Not Applicable   • Nicotine dependence, cigarettes, with withdrawal [F17.213]  Unknown   • High-risk pregnancy in second trimester [O09.92]  Not Applicable   • Maternal tobacco use in second trimester [O99.332]  Yes   • Multigravida of advanced maternal age in second trimester [O09.522]  Yes     NIPS: low risk male fetus     • Drug use complicating pregnancy in first trimester [O99.321]  Yes     Reports meth use early pregnancy  Neg UDS at IOB     • Chronic hepatitis C complicating pregnancy, antepartum (HCC) [O98.419, B18.2]  Yes     Has not been treated; needs referral to GI for tx after delivery     • Chronic hepatitis C without hepatic coma (HCC) [B18.2]  Yes               Plan of care has been reviewed with patient and patient agrees.   Risks, benefits of treatment plan have been discussed.  All questions have been answered.        Electronically signed by Robles Villa MD, 03/03/23, 9:21 AM CST.      Electronically signed by Robles Villa MD at 03/03/23 0926       Lab Results (last 48 hours)     Procedure Component Value Units Date/Time    TISSUE EXAM, [facility]) [[id number]] Collected: 03/07/23 1920    Specimen: Tissue Updated: 03/08/23 0650    Urine Drug Screen - Urine, Clean Catch [[id number]]  (Abnormal) Collected: 03/06/23 2011    Specimen: Urine, Clean Catch Updated: 03/06/23 2059     THC, Screen, Urine Negative     Phencyclidine (PCP), Urine Negative     Cocaine Screen, Urine Negative     Methamphetamine, Ur Positive     Opiate Screen Negative     Amphetamine Screen, Urine Positive     Benzodiazepine Screen, Urine Negative     Tricyclic Antidepressants Screen Negative     Methadone Screen, Urine Negative     Barbiturates Screen, Urine Negative     Oxycodone Screen, Urine Negative      Propoxyphene Screen Negative     Buprenorphine, Screen, Urine Negative    Narrative:      Cutoff For Drugs Screened:    Amphetamines               500 ng/ml  Barbiturates               200 ng/ml  Benzodiazepines            150 ng/ml  Cocaine                    150 ng/ml  Methadone                  200 ng/ml  Opiates                    100 ng/ml  Phencyclidine               25 ng/ml  THC                            50 ng/ml  Methamphetamine            500 ng/ml  Tricyclic Antidepressants  300 ng/ml  Oxycodone                  100 ng/ml  Propoxyphene               300 ng/ml  Buprenorphine               10 ng/ml    The normal value for all drugs tested is negative. This report includes unconfirmed screening results, with the cutoff values listed, to be used for medical treatment purposes only.  Unconfirmed results must not be used for non-medical purposes such as employment or legal testing.  Clinical consideration should be applied to any drug of abuse test, particularly when unconfirmed results are used.      Amphetamines, [facility], UR - Urine, Clean Catch [[id number]] Collected: 03/06/23 2011    Specimen: Urine, Clean Catch Updated: 03/06/23 2059    CBC (No Diff) [[id number]]  (Abnormal) Collected: 03/06/23 2028    Specimen: Blood Updated: 03/06/23 2048     WBC 8.55 10*3/mm3      RBC 4.04 10*6/mm3      Hemoglobin 11.9 g/dL      Hematocrit 34.4 %      MCV 85.1 fL      MCH 29.5 pg      MCHC 34.6 g/dL      RDW 13.9 %      RDW-SD 42.6 fl      MPV 9.1 fL      Platelets 398 10*3/mm3           Imaging Results (Last 48 Hours)     ** No results found for the last 48 hours. **        ECG/EMG Results (last 48 hours)     ** No results found for the last 48 hours. **           Physician Progress Notes (last 48 hours)      Shameka Thomas DO at 03/07/23 0725        To patient room for evaluation and management.   More painful contractions. No VB or LOF. Good FM. Able to sleep some overnight. Interested in epidural. 4/50/-2,  soft  S/p Cytotec x1. Plan to start pitocin for augmentation.  Cat I tracing. Ctx q2-4 mins.    Electronically signed by Shameka Thomas DO at 03/07/23 0729       Medical Student Notes (last 48 hours)  Notes from 03/06/23 0948 through 03/08/23 0948   No notes of this type exist for this encounter.       Consult Notes (last 48 hours)  Notes from 03/06/23 0948 through 03/08/23 0948   No notes of this type exist for this encounter.

## 2023-03-08 NOTE — PLAN OF CARE
Problem: Adult Inpatient Plan of Care  Goal: Plan of Care Review  Outcome: Ongoing, Progressing  Flowsheets (Taken 3/8/2023 0307)  Progress: improving  Plan of Care Reviewed With: patient  Outcome Evaluation: VSS, pain controlled, took prn pain meds and cough meds and showed improvement in pain, voided several times since birth, passing flatus, visited baby in nicu once this shift so far, ambulated in room well and to nicu, fundus firm, lochia light  Goal: Patient-Specific Goal (Individualized)  Outcome: Ongoing, Progressing  Goal: Absence of Hospital-Acquired Illness or Injury  Outcome: Ongoing, Progressing  Intervention: Identify and Manage Fall Risk  Recent Flowsheet Documentation  Taken 3/8/2023 0305 by Madeline Navarro, RN  Safety Promotion/Fall Prevention: (returned from nicu) safety round/check completed  Taken 3/8/2023 0233 by Madeline Navarro RN  Safety Promotion/Fall Prevention: (going to nicu)   safety round/check completed   patient off unit  Taken 3/8/2023 0104 by Madeline Navarro RN  Safety Promotion/Fall Prevention: safety round/check completed  Taken 3/7/2023 2248 by Madeline Navarro RN  Safety Promotion/Fall Prevention: safety round/check completed  Taken 3/7/2023 2150 by Madeline Navarro RN  Safety Promotion/Fall Prevention: safety round/check completed  Taken 3/7/2023 2117 by Madeline Navarro RN  Safety Promotion/Fall Prevention: safety round/check completed  Taken 3/7/2023 1924 by Madeline Navarro RN  Safety Promotion/Fall Prevention:   safety round/check completed   clutter free environment maintained   assistive device/personal items within reach  Intervention: Prevent and Manage VTE (Venous Thromboembolism) Risk  Recent Flowsheet Documentation  Taken 3/8/2023 0233 by Madeline Navarro, RN  Activity Management: (ambulated in ebatty to nicu) --  Taken 3/7/2023 2000 by Madeline Navarro, RN  Activity Management: ambulated in room  Taken 3/7/2023 1924 by Madeline Navarro, RN  VTE Prevention/Management:  patient refused intervention  Intervention: Prevent Infection  Recent Flowsheet Documentation  Taken 3/7/2023 1924 by Madeline Navarro RN  Infection Prevention:   visitors restricted/screened   rest/sleep promoted  Goal: Optimal Comfort and Wellbeing  Outcome: Ongoing, Progressing  Intervention: Monitor Pain and Promote Comfort  Recent Flowsheet Documentation  Taken 3/8/2023 0305 by Madeline Navarro RN  Pain Management Interventions: see MAR  Taken 3/7/2023 2150 by Madeline Navarro RN  Pain Management Interventions: see MAR  Taken 3/7/2023 2117 by Madeline Navarro RN  Pain Management Interventions: see MAR  Intervention: Provide Person-Centered Care  Recent Flowsheet Documentation  Taken 3/7/2023 1924 by Madeline Navarro RN  Trust Relationship/Rapport:   care explained   choices provided   emotional support provided   empathic listening provided   questions answered   questions encouraged   reassurance provided   thoughts/feelings acknowledged  Goal: Readiness for Transition of Care  Outcome: Ongoing, Progressing     Problem: Device-Related Complication Risk (Anesthesia/Analgesia, Neuraxial)  Goal: Safe Infusion Delivery Completion  Outcome: Ongoing, Progressing     Problem: Infection (Anesthesia/Analgesia, Neuraxial)  Goal: Absence of Infection Signs and Symptoms  Outcome: Ongoing, Progressing  Intervention: Prevent or Manage Infection  Recent Flowsheet Documentation  Taken 3/7/2023 1924 by Madeline Navarro RN  Infection Prevention:   visitors restricted/screened   rest/sleep promoted     Problem: Nausea and Vomiting (Anesthesia/Analgesia, Neuraxial)  Goal: Nausea and Vomiting Relief  Outcome: Ongoing, Progressing     Problem: Pain (Anesthesia/Analgesia, Neuraxial)  Goal: Effective Pain Control  Outcome: Ongoing, Progressing  Intervention: Prevent or Manage Pain  Recent Flowsheet Documentation  Taken 3/8/2023 0305 by Madeline Navarro RN  Pain Management Interventions: see MAR  Taken 3/7/2023 2150 by Madeline Navarro  MICHAEL RN  Pain Management Interventions: see MAR  Taken 3/7/2023 2117 by Madeline Navarro RN  Pain Management Interventions: see MAR  Taken 3/7/2023 1924 by Madeline Navarro RN  Diversional Activities:   smartphone   television     Problem: Respiratory Compromise (Anesthesia/Analgesia, Neuraxial)  Goal: Effective Oxygenation and Ventilation  Outcome: Ongoing, Progressing     Problem: Sensorimotor Impairment (Anesthesia/Analgesia, Neuraxial)  Goal: Baseline Motor Function  Outcome: Ongoing, Progressing  Intervention: Optimize Sensorimotor Function  Recent Flowsheet Documentation  Taken 3/8/2023 0305 by Madeline Navarro RN  Safety Promotion/Fall Prevention: (returned from nicu) safety round/check completed  Taken 3/8/2023 0233 by Madeline Navarro RN  Safety Promotion/Fall Prevention: (going to nicu)   safety round/check completed   patient off unit  Taken 3/8/2023 0104 by Madeline Navarro RN  Safety Promotion/Fall Prevention: safety round/check completed  Taken 3/7/2023 2248 by Madeline Navarro RN  Safety Promotion/Fall Prevention: safety round/check completed  Taken 3/7/2023 2150 by Madeline Navarro RN  Safety Promotion/Fall Prevention: safety round/check completed  Taken 3/7/2023 2117 by Madeline Navarro RN  Safety Promotion/Fall Prevention: safety round/check completed  Taken 3/7/2023 1924 by Madeline Navarro RN  Safety Promotion/Fall Prevention:   safety round/check completed   clutter free environment maintained   assistive device/personal items within reach     Problem: Urinary Retention (Anesthesia/Analgesia, Neuraxial)  Goal: Effective Urinary Elimination  Outcome: Ongoing, Progressing     Problem:  Fall Injury Risk  Goal: Absence of Fall, Infant Drop and Related Injury  Outcome: Ongoing, Progressing  Intervention: Identify and Manage Contributors  Recent Flowsheet Documentation  Taken 3/7/2023 1924 by Madeline Navarro RN  Medication Review/Management: medications reviewed  Intervention: Promote  Injury-Free Environment  Recent Flowsheet Documentation  Taken 3/8/2023 0305 by Madeilne Navarro RN  Safety Promotion/Fall Prevention: (returned from nicu) safety round/check completed  Taken 3/8/2023 0233 by Madeline Navarro RN  Safety Promotion/Fall Prevention: (going to nicu)   safety round/check completed   patient off unit  Taken 3/8/2023 0104 by Madeline Navarro RN  Safety Promotion/Fall Prevention: safety round/check completed  Taken 3/7/2023 2248 by Madeline Navarro RN  Safety Promotion/Fall Prevention: safety round/check completed  Taken 3/7/2023 2150 by Madeline Navarro RN  Safety Promotion/Fall Prevention: safety round/check completed  Taken 3/7/2023 2117 by Madeline Navarro RN  Safety Promotion/Fall Prevention: safety round/check completed  Taken 3/7/2023 1924 by Madeline Navarro RN  Safety Promotion/Fall Prevention:   safety round/check completed   clutter free environment maintained   assistive device/personal items within reach     Problem: Adjustment to Role Transition (Postpartum Vaginal Delivery)  Goal: Successful Maternal Role Transition  Outcome: Ongoing, Progressing     Problem: Bleeding (Postpartum Vaginal Delivery)  Goal: Hemostasis  Outcome: Ongoing, Progressing     Problem: Infection (Postpartum Vaginal Delivery)  Goal: Absence of Infection Signs/Symptoms  Outcome: Ongoing, Progressing  Intervention: Prevent or Manage Infection  Recent Flowsheet Documentation  Taken 3/7/2023 2000 by Madeline Navarro RN  Perineal Care: absorbent brief/pad changed  Taken 3/7/2023 1924 by Madeline Navarro RN  Perineal Care: absorbent brief/pad changed     Problem: Pain (Postpartum Vaginal Delivery)  Goal: Acceptable Pain Control  Outcome: Ongoing, Progressing  Intervention: Prevent or Manage Pain  Recent Flowsheet Documentation  Taken 3/8/2023 0305 by Madeline Navarro, RN  Pain Management Interventions: see MAR  Taken 3/7/2023 2150 by Madeline Navarro RN  Pain Management Interventions: see MAR  Taken 3/7/2023  2117 by Madeline Navarro, RN  Pain Management Interventions: see MAR     Problem: Urinary Retention (Postpartum Vaginal Delivery)  Goal: Effective Urinary Elimination  Outcome: Ongoing, Progressing   Goal Outcome Evaluation:  Plan of Care Reviewed With: patient        Progress: improving  Outcome Evaluation: VSS, pain controlled, took prn pain meds and cough meds and showed improvement in pain, voided several times since birth, passing flatus, visited baby in nicu once this shift so far, ambulated in room well and to nicu, fundus firm, lochia light

## 2023-03-08 NOTE — DISCHARGE SUMMARY
[facility]  Discharge Summary  Patient Name: Latisha Turner  : 1985  MRN: [id number]  CSN: [id number]    Discharge Summary    Date of Admission: 3/6/2023   Date of Discharge: 3/8/2023    Principle Discharge Dx: Active Hospital Problems    Diagnosis  POA   • **Encounter for induction of labor [Z34.90]  Not Applicable   • Pregnancy complicated by noncompliance, antepartum [O09.899, Z91.199]  Not Applicable   • Post term pregnancy over 40 weeks [O48.0]  Yes   • History of inadequate prenatal care [O09.30]  Not Applicable   • Nicotine dependence, cigarettes, with withdrawal [F17.213]  Yes   • Maternal tobacco use in third trimester [O99.333]  Yes   • Multigravida of advanced maternal age in second trimester [O09.522]  Yes     NIPS: low risk male fetus     • Systemic lupus erythematosus complicating pregnancy in first trimester (HCC) [O99.891, M32.9]  Yes     Pt states Lupus has not been a problem for her in 2 years. Declines MFM referral.   Declines ASA for prevention of Pre-E.  Declines Plaquenil.     • Drug dependence affecting pregnancy in third trimester [O99.323]  Yes     Reports meth use early pregnancy  Neg UDS at IOB     • High-risk pregnancy in third trimester [O09.93]  Not Applicable   • Chronic hepatitis C complicating pregnancy, antepartum (HCC) [O98.419, B18.2]  Yes     Has not been treated; needs referral to GI for tx after delivery        Procedures Performed:    Brief History: Patient is a 37 y.o. now  who presented to labor and delivery at 41w0d at recommendation for IOL for approaching post dates, lupus, AMA.   Hospital Course: Patient presented at 41w0d.  She progressed well with IOL had a  with a shoulder dystocia lasting approximately 30 seconds. Pregnancy was complicated by history of lupus, previous AMA visit, substance use, tobacco use, inadequate prenatal care, noncompliance, and chronic hepatitis C. She also had a positive UDS for  methamphetamines. Her postpartum course was unremarkable. On PPD #1 she expressed the desire for discharge.  She had passed gas and was urinating normally.  She was eating a regular diet without difficulty.  She was ambulating well.  Discharge instructions were given.  All questions were answered    Vitals:    03/07/23 1953 03/07/23 2000 03/08/23 0101 03/08/23 0436   BP: 138/64  151/73 148/72   BP Location:   Right arm Right arm   Patient Position:   Lying Sitting   Pulse: 59 71 69 82   Resp:  18 20 22   Temp:  98.1 °F (36.7 °C) 98.3 °F (36.8 °C) 98.2 °F (36.8 °C)   TempSrc:  Oral Oral Oral   SpO2:  96% 96% 98%   Weight:       Height:         Physical Exam  Vitals reviewed.   Constitutional:       General: She is not in acute distress.     Appearance: Normal appearance.   Cardiovascular:      Rate and Rhythm: Normal rate and regular rhythm.      Pulses: Normal pulses.      Heart sounds: Normal heart sounds.   Pulmonary:      Effort: Pulmonary effort is normal. No respiratory distress.      Breath sounds: Normal breath sounds.   Abdominal:      General: Abdomen is flat. Bowel sounds are normal.      Palpations: Abdomen is soft.   Musculoskeletal:      Right lower leg: No edema.      Left lower leg: No edema.   Skin:     General: Skin is warm and dry.      Capillary Refill: Capillary refill takes less than 2 seconds.   Neurological:      General: No focal deficit present.      Mental Status: She is alert and oriented to person, place, and time.   Psychiatric:         Mood and Affect: Mood normal.         Behavior: Behavior normal.      Condition:  Discharge Activity: Stable  Activity Instructions     Bathing Restrictions      Type of Restriction: Bathing    Bathing Restrictions: Other    Explain Bathing Restrictions: No soaking in bathtub for 4 weeks. Showers are fine.    Driving Restrictions      Type of Restriction: Driving    Driving Restrictions: No Driving (Time Limited)    Length: Other    Indicate Length of  Restriction: No driving for 1 week or if using narcotic pain medications. Riding is car is fine.    Lifting Restrictions      Type of Restriction: Lifting    Lifting Restrictions: Other    Explain Lifing Restrictions: No lifting more than infant and baby carrier together for 6 weeks.    Pelvic Rest      Nothing in the vagina for 6 weeks to include tampons, intercourse, or douching.    Sexual Activity Restrictions      Type of Restriction: Sex    Explain Sexual Activity Restrictions: No sexual intercourse for at least 6 weeks         Discharge Diet: Diet Instructions     Diet: Regular      Discharge Diet: Regular         Discharge Medications:    Your medication list      START taking these medications      Instructions Last Dose Given Next Dose Due   acetaminophen 325 MG tablet  Commonly known as: TYLENOL      Take 2 tablets by mouth Every 6 (Six) Hours As Needed for Mild Pain (Second Line: Mild pain unrelieved by ibuprofen. Stagger doses 3 hours after dose of ibuprofen.).       docusate sodium 100 MG capsule      Take 1 capsule by mouth 2 (Two) Times a Day.       ibuprofen 600 MG tablet  Commonly known as: ADVIL,MOTRIN      Take 1 tablet by mouth Every 6 (Six) Hours As Needed for Mild Pain (First Line: Mild pain.).          CONTINUE taking these medications      Instructions Last Dose Given Next Dose Due   prenatal (CLASSIC) vitamin  tablet  Generic drug: prenatal vitamin      Take  by mouth Daily.          ASK your doctor about these medications      Instructions Last Dose Given Next Dose Due   aspirin 81 MG EC tablet      Take 1 tablet by mouth Daily.             Where to Get Your Medications      Information about where to get these medications is not yet available    Ask your nurse or doctor about these medications  · acetaminophen 325 MG tablet  · docusate sodium 100 MG capsule  · ibuprofen 600 MG tablet        Discharge Disposition: Home   Follow-up: No future appointments.  2 week PP visit (telephone)  6  week PP visit     <30 minutes was spent with the patient on the day of discharge.    This document has been electronically signed by Helio Tyler MD on March 8, 2023 08:18 CST    I personally saw and examined the patient with Dr. Tyler and was present during the key portion of the E/M service. I agree with the history and exam as documented by the resident. Otherwise, I agree with the assessment and plan as outlined above.        This document has been electronically signed by Shameka Thomas DO on March 16, 2023 21:27 CDT

## 2023-03-09 NOTE — PAYOR COMM NOTE
"Amelie Lancaster  [facility]  Case Management Extender  [phone] phone  [phone] fax      Auth# [id number]    Latisha Turner (37 y.o. Female)     Date of Birth   1985    Social Security Number       Address   [address] [address]    Home Phone   [phone]    MRN   [id number]       Jainism   Voodoo    Marital Status   Significant Other                            Admission Date   3/6/23    Admission Type   Elective    Admitting Provider   Shameka Thomas DO    Attending Provider       Department, Room/Bed   [facility] MOTHER BABY, M751/1       Discharge Date   3/8/2023    Discharge Disposition   Home or Self Care    Discharge Destination                               Attending Provider: (none)   Allergies: No Known Allergies    Isolation: None   Infection: None   Code Status: Prior    Ht: 160 cm (63\")   Wt: 95.3 kg (210 lb)    Admission Cmt: None   Principal Problem: Encounter for induction of labor [Z34.90]                 Active Insurance as of 3/6/2023     Primary Coverage     Payor Plan Insurance Group Employer/Plan Group    ANTHEM MEDICAID ANTH MEDICAID [id number]     Payor Plan Address Payor Plan Phone Number Payor Plan Fax Number Effective Dates    [address] [address] [phone]  2019 - None Entered    [facility] [id number]       Subscriber Name Subscriber Birth Date Member ID       LATISHA TURNER 1985 [id number]                 Emergency Contacts      (Rel.) Home Phone Work Phone Mobile Phone    Landry Lora (Partner) -- -- [phone]               Discharge Summary      Helio Tyler MD at 23 0700          [facility]  Discharge Summary  Patient Name: Latisha Turner  : 1985  MRN: [id number]  CSN: [id number]    Discharge Summary    Date of Admission: 3/6/2023   Date of Discharge: 3/8/2023    Principle Discharge Dx: Active Hospital " Problems    Diagnosis  POA   • **Encounter for induction of labor [Z34.90]  Not Applicable   • Pregnancy complicated by noncompliance, antepartum [O09.899, Z91.199]  Not Applicable   • Post term pregnancy over 40 weeks [O48.0]  Yes   • History of inadequate prenatal care [O09.30]  Not Applicable   • Nicotine dependence, cigarettes, with withdrawal [F17.213]  Yes   • Maternal tobacco use in third trimester [O99.333]  Yes   • Multigravida of advanced maternal age in second trimester [O09.522]  Yes     NIPS: low risk male fetus     • Systemic lupus erythematosus complicating pregnancy in first trimester (HCC) [O99.891, M32.9]  Yes     Pt states Lupus has not been a problem for her in 2 years. Declines MFM referral.   Declines ASA for prevention of Pre-E.  Declines Plaquenil.     • Drug dependence affecting pregnancy in third trimester [O99.323]  Yes     Reports meth use early pregnancy  Neg UDS at IOB     • High-risk pregnancy in third trimester [O09.93]  Not Applicable   • Chronic hepatitis C complicating pregnancy, antepartum (HCC) [O98.419, B18.2]  Yes     Has not been treated; needs referral to GI for tx after delivery        Procedures Performed:    Brief History: Patient is a 37 y.o. now  who presented to labor and delivery at 41w1d.   Hospital Course: Patient presented at 41w1d.  She had a  with a shoulder dystocia. Pregnancy was complicated by history of lupus, previous AMA visit, substance use, tobacco use, inadequate prenatal care, noncompliance, and chronic hepatitis C. She also had a positive UDS for methamphetamines. Her postpartum course was unremarkable. On PPD #1 she expressed the desire for discharge.  She had passed gas and was urinating normally.  She was eating a regular diet without difficulty.  She was ambulating well.  Discharge instructions were given.  All questions were answered    Vitals:    23 1953 03/07/23 2000 03/08/23 01023 0436   BP: 138/64  151/73 148/72    BP Location:   Right arm Right arm   Patient Position:   Lying Sitting   Pulse: 59 71 69 82   Resp:  18 20 22   Temp:  98.1 °F (36.7 °C) 98.3 °F (36.8 °C) 98.2 °F (36.8 °C)   TempSrc:  Oral Oral Oral   SpO2:  96% 96% 98%   Weight:       Height:         Physical Exam  Vitals reviewed.   Constitutional:       General: She is not in acute distress.     Appearance: Normal appearance.   Cardiovascular:      Rate and Rhythm: Normal rate and regular rhythm.      Pulses: Normal pulses.      Heart sounds: Normal heart sounds.   Pulmonary:      Effort: Pulmonary effort is normal. No respiratory distress.      Breath sounds: Normal breath sounds.   Abdominal:      General: Abdomen is flat. Bowel sounds are normal.      Palpations: Abdomen is soft.   Musculoskeletal:      Right lower leg: No edema.      Left lower leg: No edema.   Skin:     General: Skin is warm and dry.      Capillary Refill: Capillary refill takes less than 2 seconds.   Neurological:      General: No focal deficit present.      Mental Status: She is alert and oriented to person, place, and time.   Psychiatric:         Mood and Affect: Mood normal.         Behavior: Behavior normal.      Condition:  Discharge Activity: Stable  Activity Instructions     Bathing Restrictions      Type of Restriction: Bathing    Bathing Restrictions: Other    Explain Bathing Restrictions: No soaking in bathtub for 4 weeks. Showers are fine.    Driving Restrictions      Type of Restriction: Driving    Driving Restrictions: No Driving (Time Limited)    Length: Other    Indicate Length of Restriction: No driving for 1 week or if using narcotic pain medications. Riding is car is fine.    Lifting Restrictions      Type of Restriction: Lifting    Lifting Restrictions: Other    Explain Lifing Restrictions: No lifting more than infant and baby carrier together for 6 weeks.    Pelvic Rest      Nothing in the vagina for 6 weeks to include tampons, intercourse, or douching.    Sexual  Activity Restrictions      Type of Restriction: Sex    Explain Sexual Activity Restrictions: No sexual intercourse for at least 6 weeks         Discharge Diet: Diet Instructions     Diet: Regular      Discharge Diet: Regular         Discharge Medications:    Your medication list      START taking these medications      Instructions Last Dose Given Next Dose Due   acetaminophen 325 MG tablet  Commonly known as: TYLENOL      Take 2 tablets by mouth Every 6 (Six) Hours As Needed for Mild Pain (Second Line: Mild pain unrelieved by ibuprofen. Stagger doses 3 hours after dose of ibuprofen.).       docusate sodium 100 MG capsule      Take 1 capsule by mouth 2 (Two) Times a Day.       ibuprofen 600 MG tablet  Commonly known as: ADVIL,MOTRIN      Take 1 tablet by mouth Every 6 (Six) Hours As Needed for Mild Pain (First Line: Mild pain.).          CONTINUE taking these medications      Instructions Last Dose Given Next Dose Due   prenatal (CLASSIC) vitamin  tablet  Generic drug: prenatal vitamin      Take  by mouth Daily.          ASK your doctor about these medications      Instructions Last Dose Given Next Dose Due   aspirin 81 MG EC tablet      Take 1 tablet by mouth Daily.             Where to Get Your Medications      Information about where to get these medications is not yet available    Ask your nurse or doctor about these medications  · acetaminophen 325 MG tablet  · docusate sodium 100 MG capsule  · ibuprofen 600 MG tablet        Discharge Disposition: Home   Follow-up: No future appointments.  2 week PP visit (telephone)  6 week PP visit     <30 minutes was spent with the patient on the day of discharge.    This document has been electronically signed by Helio Tyler MD on March 8, 2023 08:18 CST      Electronically signed by Helio Tyler MD at 03/08/23 0819       Discharge Order (From admission, onward)     Start     Ordered    03/08/23 0723  Discharge patient  Once        Expected Discharge Date:  03/08/23    Discharge Disposition: Home or Self Care    Physician of Record for Attribution - Please select from Treatment Team: PROSPER WALTER [[id number]]    Review needed by CMO to determine Physician of Record: No       Question Answer Comment   Physician of Record for Attribution - Please select from Treatment Team PROSPER WALTER    Review needed by CMO to determine Physician of Record No        03/08/23 0818

## 2023-03-10 LAB
AMPHET/CREAT UR: 1172 NG/MG CREAT
AMPHETAMINES UR QL CFM: NORMAL
LEVEL OF DETECTION:: NORMAL
MDA/CREAT UR: NOT DETECTED NG/MG CREAT
MDMA/CREAT UR: NOT DETECTED NG/MG CREAT
METHAMPHET/CREAT UR: 2476 NG/MG CREAT

## 2023-03-13 LAB
AMPHET/CREAT UR: 1243 NG/MG CREAT
AMPHETAMINES UR QL CFM: NORMAL
LEVEL OF DETECTION:: NORMAL
MDA/CREAT UR: NOT DETECTED NG/MG CREAT
MDMA/CREAT UR: NOT DETECTED NG/MG CREAT
METHAMPHET/CREAT UR: 1912 NG/MG CREAT
REF LAB TEST METHOD: NORMAL

## 2023-03-22 ENCOUNTER — POSTPARTUM VISIT (OUTPATIENT)
Dept: OBSTETRICS AND GYNECOLOGY | Facility: CLINIC | Age: 38
End: 2023-03-22
Payer: MEDICAID

## 2023-03-22 VITALS
SYSTOLIC BLOOD PRESSURE: 118 MMHG | BODY MASS INDEX: 33.73 KG/M2 | DIASTOLIC BLOOD PRESSURE: 80 MMHG | WEIGHT: 190.4 LBS | HEIGHT: 63 IN

## 2023-03-22 RX ORDER — NORGESTIMATE AND ETHINYL ESTRADIOL 7DAYSX3 LO
1 KIT ORAL DAILY
Qty: 28 TABLET | Refills: 12 | Status: SHIPPED | OUTPATIENT
Start: 2023-03-22 | End: 2024-03-21

## 2023-03-22 NOTE — PROGRESS NOTES
"Postpartum visit      Latisha Turner is a 37 y.o.  s/p Vaginal, Spontaneous on 3/7/23 at 41+1 secondary to approaching post dates/post term, lupus, AMA who presents today for postpartum check.  The patient states doing okay. Very tearful as infant is living with her friend, taken from care by CPS. States she and her partner have been living in sobriety, both going to rehab, hoping at court date in May could get custody. Although stressed, states mood is good.  Patient denies postpartum depression.  Menstrual cycles have not resumed, bleeding lightening.  Formula feeding.  Desires likely OCP for contraception.  She has not resumed sexual intercourse.  Denies bowel or bladder issues.    3/7/23 boy 3740 g, APGARs 8+9, McRobert's, suprapubic pressure, delivery posterior arm for right shoulder dystocia lasting 30 secs.     IOL at 41+0, delivered 41+1 APD, AMA, lupus, chronic hep C, noncompliance, substance use.     PHYSICAL EXAM:    /80 (BP Location: Left arm, Patient Position: Sitting, Cuff Size: Adult)   Ht 160 cm (63\")   Wt 86.4 kg (190 lb 6.4 oz)   LMP 05/15/2022   Breastfeeding No   BMI 33.73 kg/m²   Gen: well appearing, NAD  Abdomen: benign, no masses, soft, non-tender.  Extremities: No deep calf tenderness.  Postpartum Depression Screening Questionnaire: 1, no treatment indicated.    IMPRESSION/PLAN: Latisha Turner is a 37 y.o.  s/p Vaginal, Spontaneous on 3/7/23.  Doing well.   - Recovered nicely from her delivery  - Contraception: sent Ortho-Tricyclen Lo to pharmacy, discussed starting >4 weeks after delivery due to risk of VTE, sent now as patient will be in rehab at that time  - RTC 4-5 weeks for final PP visit  - No pap on chart, states last , cn obtain vs. obtain PP pap      This document has been electronically signed by Shameka Thomas DO on 2023 11:21 CDT     "

## 2023-04-10 ENCOUNTER — TELEPHONE (OUTPATIENT)
Dept: OBSTETRICS AND GYNECOLOGY | Facility: CLINIC | Age: 38
End: 2023-04-10

## 2023-04-10 NOTE — TELEPHONE ENCOUNTER
Patient stated that she is tired of bleeding and being on her period. She has never had these issues before and it has been a long time since she has been on birth control. The last 2 men that she was with were fixed and she didn't have to worry about birth control. She stated that she only bleeds for one day with using one tampon and then she is done so she doesn't even know how she got pregnant. She just wants to stop bleeding and it's her birthday and she is over it. She stated that she was supposed to start the birth control at 4 weeks after delivery but the pharmacist told her that she can start it earlier and that if she lays around she would develop blood clots. I let her know that I relayed that information to Dr. Thomas. She also wanted to know if she could skip the 4th week in the pack that brings on the period and just start the next week and I spoke with Dr. Thomas who stated that if she wants to do that then it's fine. I also let her know that it will take some time for her body to adjust to the birth control and then things should go back to normal, she just has to give it some time. She verbalized her understanding.

## 2023-04-10 NOTE — TELEPHONE ENCOUNTER
PT requested a returned called to #[phone] to discuss why she is still bleeding. PT asked if  stopping the BC would help.

## 2023-05-03 ENCOUNTER — POSTPARTUM VISIT (OUTPATIENT)
Dept: OBSTETRICS AND GYNECOLOGY | Facility: CLINIC | Age: 38
End: 2023-05-03

## 2023-05-03 VITALS
HEIGHT: 63 IN | WEIGHT: 182.8 LBS | SYSTOLIC BLOOD PRESSURE: 120 MMHG | BODY MASS INDEX: 32.39 KG/M2 | DIASTOLIC BLOOD PRESSURE: 64 MMHG

## 2023-05-03 DIAGNOSIS — N93.9 ABNORMAL UTERINE BLEEDING (AUB): Primary | ICD-10-CM

## 2023-05-03 RX ORDER — MEDROXYPROGESTERONE ACETATE 10 MG/1
TABLET ORAL
Qty: 30 TABLET | Refills: 1 | Status: SHIPPED | OUTPATIENT
Start: 2023-05-03

## 2023-05-03 NOTE — PROGRESS NOTES
"Postpartum visit      Latisha Turner is a 37 y.o.  s/p Vaginal, Spontaneous on 3/7/23 at 41+1 secondary to approaching post dates/post term, lupus, AMA who presents today for postpartum check.  The patient states overall is doing well.  Patient denies postpartum depression.  Menstrual cycles have not resumed, has been having some mild continuous bleeding but thinks now starting period.  Bottle feeding.  Desires OCP for contraception.  She has not resumed sexual intercourse.  Denies bowel or bladder issues. Working to get custody of child. States status post rehab. Working with .    3/7/23 boy 3740 g, APGARs 8+9, McRobert's, suprapubic pressure, delivery posterior arm for right shoulder dystocia lasting 30 secs.     IOL at 41+0, delivered 41+1 APD, AMA, lupus, chronic hep C, noncompliance, substance use.     PHYSICAL EXAM:    /64 (BP Location: Left arm, Patient Position: Sitting, Cuff Size: Adult)   Ht 160 cm (63\")   Wt 82.9 kg (182 lb 12.8 oz)   Breastfeeding No   BMI 32.38 kg/m²   Breast Exam:  no masses, skin dimpling, nipple retraction, or nipple discharge bilaterally.  Abdomen: benign, no masses, soft, non-tender.  Bimanual: declined by patient   Extremities: No deep calf tenderness.  Postpartum Depression Screening Questionnaire: 1, no treatment indicated.    IMPRESSION/PLAN: Latisha Turner is a 38 y.o. s/p Vaginal, Spontaneous on 3/7/23.  Doing well.   - Recovered nicely from her delivery  - Contraception: sent Ortho-Tricyclen Lo to pharmacy at previous request had not started (no h/o blood clots or APA, discussed monitoring for s/s of VTE as may be increased risk with lupus, used OCP previously); recommend Provera to see if can get bleeding to stop; if not, needs US and followup  - 2020 NIL, needs repeat at followup      This document has been electronically signed by Shameka Thomas DO on May 3, 2023 10:33 CDT       "

## 2023-06-07 ENCOUNTER — OFFICE VISIT (OUTPATIENT)
Dept: GASTROENTEROLOGY | Facility: CLINIC | Age: 38
End: 2023-06-07
Payer: MEDICAID

## 2023-06-07 VITALS
TEMPERATURE: 97.3 F | HEIGHT: 63 IN | HEART RATE: 94 BPM | OXYGEN SATURATION: 99 % | BODY MASS INDEX: 31.71 KG/M2 | DIASTOLIC BLOOD PRESSURE: 74 MMHG | WEIGHT: 179 LBS | SYSTOLIC BLOOD PRESSURE: 110 MMHG

## 2023-06-07 DIAGNOSIS — R76.8 HCV ANTIBODY POSITIVE: Primary | ICD-10-CM

## 2023-06-07 NOTE — PROGRESS NOTES
Primary Physician: Mary Harley APRN    Chief Complaint   Patient presents with    Hepatitis C     Pt presents today for evaluation for hep C-never completed workup back in 2020 and pt would like to discuss treatment options        Subjective     Latisha Turner is a 38 y.o. female.    HPI  Hepatitis C  Patient comes to our office for consideration of hepatitis C treatment.  Most recent labs confirming the hepatitis C virus was in 2022.  Treatment naive.  She was told she had hepatitis C 10 years ago.  She had previous IV drug use.  She no longer does any IV drug use.  Denies any alcohol use.      Past Medical History:   Diagnosis Date    Abnormal Pap smear of cervix     Chronic hepatitis C     Lupus     Substance abuse     Trauma     Varicella        Past Surgical History:   Procedure Laterality Date    WISDOM TOOTH EXTRACTION      WRIST FRACTURE SURGERY Left         Current Outpatient Medications:     norgestimate-ethinyl estradiol (Ortho Tri-Cyclen Lo) 0.18/0.215/0.25 MG-25 MCG per tablet, Take 1 tablet by mouth Daily. Start 4 weeks after delivery., Disp: 28 tablet, Rfl: 12    No Known Allergies    Social History     Socioeconomic History    Marital status: Significant Other   Tobacco Use    Smoking status: Every Day     Packs/day: 0.50     Types: Cigarettes    Smokeless tobacco: Never   Vaping Use    Vaping Use: Former   Substance and Sexual Activity    Alcohol use: Not Currently    Drug use: Not Currently     Types: Methamphetamines    Sexual activity: Yes     Partners: Male     Comment: last pap smear negative 6/10/20 at [facility]       Family History   Problem Relation Age of Onset    Diabetes Mother     Thyroid disease Mother     No Known Problems Daughter     No Known Problems Son     Diabetes Maternal Grandmother     Heart disease Maternal Grandmother     Kidney failure Maternal Grandmother     Cancer Maternal Grandfather     Colon cancer Neg Hx     Colon polyps Neg Hx     Esophageal  "cancer Neg Hx     Liver cancer Neg Hx     Liver disease Neg Hx     Rectal cancer Neg Hx     Stomach cancer Neg Hx        Review of Systems   Constitutional:  Negative for unexpected weight change.   Respiratory:  Negative for shortness of breath.    Cardiovascular:  Negative for chest pain.     Objective     /74 (BP Location: Left arm, Patient Position: Sitting, Cuff Size: Adult)   Pulse 94   Temp 97.3 °F (36.3 °C) (Infrared)   Ht 160 cm (63\")   Wt 81.2 kg (179 lb)   SpO2 99%   Breastfeeding No   BMI 31.71 kg/m²     Physical Exam  Vitals reviewed.   Constitutional:       Appearance: Normal appearance.   Cardiovascular:      Rate and Rhythm: Normal rate and regular rhythm.   Neurological:      Mental Status: She is alert.       Lab Results - Last 18 Months   Lab Units 08/24/22  1606   GLUCOSE mg/dL 89   BUN mg/dL 9   CREATININE mg/dL 0.81   SODIUM mmol/L 137   POTASSIUM mmol/L 4.0   CHLORIDE mmol/L 103   CO2 mmol/L 24.7   TOTAL PROTEIN g/dL 6.4   ALBUMIN g/dL 3.70   ALT (SGPT) U/L 43*   AST (SGOT) U/L 24   ALK PHOS U/L 52   BILIRUBIN mg/dL <0.2   GLOBULIN gm/dL 2.7       Lab Results - Last 18 Months   Lab Units 03/06/23  2028 03/03/23  0454 12/05/22  1019 08/03/22  1314   HEMOGLOBIN g/dL 11.9* 11.6* 11.9* 13.3   HEMATOCRIT % 34.4 34.1 33.7* 39.0   MCV fL 85.1 85.3 86.2 91.5   WBC 10*3/mm3 8.55 8.31 9.12 9.07   RDW % 13.9 13.6 12.3 12.2*   MPV fL 9.1 8.8 8.9 9.3   PLATELETS 10*3/mm3 398 384 391 396         Lab Results - Last 18 Months   Lab Units 08/03/22  1314   HEP B S AG  Non-Reactive           IMPRESSION/PLAN:    Assessment & Plan      Problem List Items Addressed This Visit    None  Visit Diagnoses       HCV antibody positive    -  Primary    Relevant Orders    HCV RNA By PCR, Qn Rfx Chani    HCV FibroSURE    Drug Screen (10), Serum    HIV-1 & HIV-2 Antibodies    Hepatitis A Antibody, Total    Hepatitis B Surface Antibody    US Liver    US Elastography Paranchyma    CBC (No Diff)    Comprehensive " Metabolic Panel    Ethanol          Labs and Liver US to begin treatment  Further recommendations to follow.                Pavithra Hu, [place]  06/07/23  10:25 CDT    Part of this note may be an electronic transcription/translation of spoken language to printed text.

## 2023-06-16 ENCOUNTER — HOSPITAL ENCOUNTER (OUTPATIENT)
Dept: ULTRASOUND IMAGING | Facility: HOSPITAL | Age: 38
Discharge: HOME | End: 2023-06-16
Payer: MEDICAID

## 2023-06-25 LAB — REF LAB TEST METHOD: NORMAL

## 2023-06-27 LAB
ALBUMIN SERPL-MCNC: 4.2 G/DL (ref 3.5–5.2)
ALBUMIN/GLOB SERPL: 1.5 G/DL
ALP SERPL-CCNC: 56 U/L (ref 39–117)
ALT SERPL W P-5'-P-CCNC: 57 U/L (ref 1–33)
ANION GAP SERPL CALCULATED.3IONS-SCNC: 10 MMOL/L (ref 5–15)
AST SERPL-CCNC: 32 U/L (ref 1–32)
BILIRUB SERPL-MCNC: 0.2 MG/DL (ref 0–1.2)
BUN SERPL-MCNC: 13 MG/DL (ref 6–20)
BUN/CREAT SERPL: 16.7 (ref 7–25)
CALCIUM SPEC-SCNC: 9.2 MG/DL (ref 8.6–10.5)
CHLORIDE SERPL-SCNC: 107 MMOL/L (ref 98–107)
CO2 SERPL-SCNC: 23 MMOL/L (ref 22–29)
CREAT SERPL-MCNC: 0.78 MG/DL (ref 0.57–1)
DEPRECATED RDW RBC AUTO: 40.2 FL (ref 37–54)
EGFRCR SERPLBLD CKD-EPI 2021: 100.5 ML/MIN/1.73
ERYTHROCYTE [DISTWIDTH] IN BLOOD BY AUTOMATED COUNT: 12.7 % (ref 12.3–15.4)
ETHANOL BLD-MCNC: <10 MG/DL (ref 0–10)
ETHANOL UR QL: <0.01 %
GLOBULIN UR ELPH-MCNC: 2.8 GM/DL
GLUCOSE SERPL-MCNC: 90 MG/DL (ref 65–99)
HBV SURFACE AB SER RIA-ACNC: REACTIVE
HCT VFR BLD AUTO: 41.6 % (ref 34–46.6)
HGB BLD-MCNC: 14.2 G/DL (ref 12–15.9)
MCH RBC QN AUTO: 29.9 PG (ref 26.6–33)
MCHC RBC AUTO-ENTMCNC: 34.1 G/DL (ref 31.5–35.7)
MCV RBC AUTO: 87.6 FL (ref 79–97)
PLATELET # BLD AUTO: 378 10*3/MM3 (ref 140–450)
PMV BLD AUTO: 8.8 FL (ref 6–12)
POTASSIUM SERPL-SCNC: 4 MMOL/L (ref 3.5–5.2)
PROT SERPL-MCNC: 7 G/DL (ref 6–8.5)
RBC # BLD AUTO: 4.75 10*6/MM3 (ref 3.77–5.28)
SODIUM SERPL-SCNC: 140 MMOL/L (ref 136–145)
WBC NRBC COR # BLD: 6.42 10*3/MM3 (ref 3.4–10.8)

## 2023-06-28 LAB — HAV AB SER QL IA: NEGATIVE

## 2023-06-30 LAB
A2 MACROGLOB SERPL-MCNC: 124 MG/DL (ref 110–276)
ALT SERPL W P-5'-P-CCNC: 34 IU/L (ref 0–40)
AMPHETAMINES SERPL QL SCN: NEGATIVE NG/ML
APO A-I SERPL-MCNC: 100 MG/DL (ref 116–209)
BARBITURATES SERPL QL SCN: NEGATIVE UG/ML
BENZODIAZ SERPL QL SCN: NEGATIVE NG/ML
BILIRUB SERPL-MCNC: 0.1 MG/DL (ref 0–1.2)
CANNABINOIDS SERPL QL SCN: NEGATIVE NG/ML
COCAINE+BZE SERPL QL SCN: NEGATIVE NG/ML
FIBROSIS SCORING:: ABNORMAL
FIBROSIS STAGE SERPL QL: ABNORMAL
GGT SERPL-CCNC: 6 IU/L (ref 0–60)
HAPTOGLOB SERPL-MCNC: 107 MG/DL (ref 33–278)
HCV AB SER QL: ABNORMAL
LABORATORY COMMENT REPORT: ABNORMAL
LIVER FIBR SCORE SERPL CALC.FIBROSURE: 0.02 (ref 0–0.21)
METHADONE SERPL QL SCN: NEGATIVE NG/ML
NECROINFLAMM ACTIVITY SCORING:: ABNORMAL
NECROINFLAMMATORY ACT GRADE SERPL QL: ABNORMAL
NECROINFLAMMATORY ACT SCORE SERPL: 0.12 (ref 0–0.17)
OPIATES SERPL QL SCN: NEGATIVE NG/ML
OXYCODONE+OXYMORPHONE SERPLBLD QL SCN: NEGATIVE NG/ML
PCP SERPL QL SCN: NEGATIVE NG/ML
PROPOXYPH SERPL QL SCN: NEGATIVE NG/ML
SERVICE CMNT-IMP: ABNORMAL
TEST PERFORMANCE INFO SPEC: ABNORMAL